# Patient Record
Sex: FEMALE | Race: WHITE | Employment: UNEMPLOYED | URBAN - METROPOLITAN AREA
[De-identification: names, ages, dates, MRNs, and addresses within clinical notes are randomized per-mention and may not be internally consistent; named-entity substitution may affect disease eponyms.]

---

## 2017-12-09 NOTE — H&P
Patient Name: Oscar Corona   Account #: [id number]    Gender: Female    (age): 1935 (80)       Provider:     Pao Kunz MD        Referring Physician:     Brenda Park [address]  [phone] (phone)  [phone] (fax)        Chief Complaint: melena           History of Present Illness:       Long standing problem bowel movement irregularity. Personal history intestinal ischemia. Had no bleeding since  when placed on asa plus clopidogrel. Last week had melena; bowel movements now cleared and appear normal.         Saw Dr Chasidy Little; CBC normal.  Advised she stop clopidogrel       Past Medical History      Medical Conditions: Arthritis  Colitis  High blood pressure  High cholesterol   Surgical Procedures: wrist   Dx Studies: CAT Scan, 2014  Colonoscopy, 2014  Endo Posting, 2017   Medications: amlodipine 5 mg  Aspirin Low Dose 81 mg  benazepril 20 mg  hydrochlorothiazide 25 mg  pravastatin 40 mg   Allergies: tetracycline   Immunizations: No Immunizations      Social History      Alcohol: None   Tobacco: Former smokerCigarettes, quit . Drugs: None   Exercise: None   Caffeine: None         Family History No Knowledge Of Family History       Review of Systems:   Cardiovascular: Denies chest pain, irregular heart beat, palpitations, peripheral edema, syncope, Sweats. Constitutional: Denies fatigue, fever, loss of appetite, weight gain, weight loss. ENMT: Denies nose bleeds, sore throat, hearing loss. Endocrine: Denies excessive thirst, heat intolerance. Eyes: Denies loss of vision. Gastrointestinal: Presents suffers from rectal bleeding. Denies abdominal pain, abdominal swelling, change in bowel habits, constipation, diarrhea, Bloating/gas, heartburn, jaundice, nausea, stomach cramps, vomiting, dysphagia, rectal pain, Stool incontinence.    Genitourinary: Denies dark urine, dysuria, frequent urination, hematuria, incontinence. Hematologic/Lymphatic: Denies easy bruising, prolonged bleeding. Integumentary: Denies itching, rashes, sun sensitivity. Musculoskeletal: Presents suffers from arthritis. Denies back pain, gout, joint pain, muscle weakness, stiffness. Neurological: Denies dizziness, fainting, frequent headaches, memory loss. Psychiatric: Denies anxiety, depression, difficulty sleeping, hallucinations, nervousness, panic attacks, paranoia. Respiratory: Presents suffers from dyspnea. Denies cough, wheezing. Vital Signs: See RN notes     Physical Exam:   Constitutional:      Appearance: No distress, appears comfortable. Skin:      Inspection: No rash, no jaundice. Head/face: Inspection: Normacephalic, atraumatic. Eyes:      Conjunctivae/lids: Normal.   ENMT:      External: Normal.   Neck:      Neck: Normal appearance, trachea midline. Respiratory:      Effort: Normal respiratory effort, comfortable, speaks in complete sentences. Auscultation: normal breath sounds, no rubs, wheezes or rhonchi. Cardiovascular: Auscultation: normal, S1 and S2, no gallops , no rubs or murmurs . Gastrointestinal/Abdomen:      Abdomen: non-distended, nontender. Liver/Spleen: normal, normal size, Liver size and consistency normal, spleen is non-palpable. Musculoskeletal:      Gait/station: normal.   Muscles: normal strength and tone, no atrophy or abnormal movements. Psychiatric:      Judgment/insight: Normal, normal judgement, normal insight. Mood and affect: Normal mood, affect full, no evidence of depression, anxiety or agitation. Lymphatic:      Neck: No lymphadenopathy in the cervical or supraclavicular chain.           Impressions: Melena              Plan: misoprostol 200 mcg Take 1 tablet by mouth three times a day for 30 days  I've discussed EGD, colonoscopy possible biopsy, polypectomy, cautery, injection, alternatives, complications including but not limited to pain, cardiopulmonary event, bleeding, perforation requiring additional blood transfusion or operative repair; all questions answered.

## 2017-12-13 ENCOUNTER — ANESTHESIA EVENT (OUTPATIENT)
Dept: ENDOSCOPY | Age: 82
End: 2017-12-13
Payer: MEDICARE

## 2017-12-13 ENCOUNTER — ANESTHESIA (OUTPATIENT)
Dept: ENDOSCOPY | Age: 82
End: 2017-12-13
Payer: MEDICARE

## 2017-12-13 ENCOUNTER — HOSPITAL ENCOUNTER (OUTPATIENT)
Age: 82
Setting detail: OUTPATIENT SURGERY
Discharge: HOME | End: 2017-12-13
Attending: INTERNAL MEDICINE | Admitting: INTERNAL MEDICINE
Payer: MEDICARE

## 2017-12-13 VITALS
RESPIRATION RATE: 21 BRPM | OXYGEN SATURATION: 98 % | BODY MASS INDEX: 24.91 KG/M2 | SYSTOLIC BLOOD PRESSURE: 117 MMHG | TEMPERATURE: 97.7 F | HEART RATE: 67 BPM | HEIGHT: 66 IN | WEIGHT: 155 LBS | DIASTOLIC BLOOD PRESSURE: 47 MMHG

## 2017-12-13 PROCEDURE — 76060000032 HC ANESTHESIA 0.5 TO 1 HR: Performed by: INTERNAL MEDICINE

## 2017-12-13 PROCEDURE — C1726 CATH, BAL DIL, NON-VASCULAR: HCPCS | Performed by: INTERNAL MEDICINE

## 2017-12-13 PROCEDURE — 74011250636 HC RX REV CODE- 250/636

## 2017-12-13 PROCEDURE — 76040000007: Performed by: INTERNAL MEDICINE

## 2017-12-13 PROCEDURE — 88305 TISSUE EXAM BY PATHOLOGIST: CPT | Performed by: INTERNAL MEDICINE

## 2017-12-13 PROCEDURE — 77030018712 HC DEV BLLN INFL BSC -B: Performed by: INTERNAL MEDICINE

## 2017-12-13 PROCEDURE — 74011000250 HC RX REV CODE- 250

## 2017-12-13 PROCEDURE — 77030013992 HC SNR POLYP ENDOSC BSC -B: Performed by: INTERNAL MEDICINE

## 2017-12-13 RX ORDER — MIDAZOLAM HYDROCHLORIDE 1 MG/ML
.25-5 INJECTION, SOLUTION INTRAMUSCULAR; INTRAVENOUS
Status: DISCONTINUED | OUTPATIENT
Start: 2017-12-13 | End: 2017-12-13 | Stop reason: HOSPADM

## 2017-12-13 RX ORDER — FENTANYL CITRATE 50 UG/ML
100 INJECTION, SOLUTION INTRAMUSCULAR; INTRAVENOUS
Status: DISCONTINUED | OUTPATIENT
Start: 2017-12-13 | End: 2017-12-13 | Stop reason: HOSPADM

## 2017-12-13 RX ORDER — DEXTROMETHORPHAN/PSEUDOEPHED 2.5-7.5/.8
1.2 DROPS ORAL
Status: DISCONTINUED | OUTPATIENT
Start: 2017-12-13 | End: 2017-12-13 | Stop reason: HOSPADM

## 2017-12-13 RX ORDER — GLYCOPYRROLATE 0.2 MG/ML
INJECTION INTRAMUSCULAR; INTRAVENOUS AS NEEDED
Status: DISCONTINUED | OUTPATIENT
Start: 2017-12-13 | End: 2017-12-13 | Stop reason: HOSPADM

## 2017-12-13 RX ORDER — AMOXICILLIN 500 MG/1
1000 CAPSULE ORAL 2 TIMES DAILY
Qty: 56 CAP | Refills: 0 | Status: SHIPPED | OUTPATIENT
Start: 2017-12-13 | End: 2017-12-27

## 2017-12-13 RX ORDER — PROPOFOL 10 MG/ML
INJECTION, EMULSION INTRAVENOUS AS NEEDED
Status: DISCONTINUED | OUTPATIENT
Start: 2017-12-13 | End: 2017-12-13 | Stop reason: HOSPADM

## 2017-12-13 RX ORDER — METRONIDAZOLE 500 MG/1
500 TABLET ORAL 2 TIMES DAILY
Qty: 28 TAB | Refills: 0 | Status: SHIPPED | OUTPATIENT
Start: 2017-12-13 | End: 2017-12-27

## 2017-12-13 RX ORDER — FLUMAZENIL 0.1 MG/ML
0.2 INJECTION INTRAVENOUS
Status: DISCONTINUED | OUTPATIENT
Start: 2017-12-13 | End: 2017-12-13 | Stop reason: HOSPADM

## 2017-12-13 RX ORDER — SODIUM CHLORIDE 9 MG/ML
50 INJECTION, SOLUTION INTRAVENOUS CONTINUOUS
Status: DISCONTINUED | OUTPATIENT
Start: 2017-12-13 | End: 2017-12-13 | Stop reason: HOSPADM

## 2017-12-13 RX ORDER — LIDOCAINE HYDROCHLORIDE 20 MG/ML
INJECTION, SOLUTION EPIDURAL; INFILTRATION; INTRACAUDAL; PERINEURAL AS NEEDED
Status: DISCONTINUED | OUTPATIENT
Start: 2017-12-13 | End: 2017-12-13 | Stop reason: HOSPADM

## 2017-12-13 RX ORDER — NALOXONE HYDROCHLORIDE 0.4 MG/ML
0.4 INJECTION, SOLUTION INTRAMUSCULAR; INTRAVENOUS; SUBCUTANEOUS
Status: DISCONTINUED | OUTPATIENT
Start: 2017-12-13 | End: 2017-12-13 | Stop reason: HOSPADM

## 2017-12-13 RX ORDER — PROPOFOL 10 MG/ML
INJECTION, EMULSION INTRAVENOUS
Status: DISCONTINUED | OUTPATIENT
Start: 2017-12-13 | End: 2017-12-13 | Stop reason: HOSPADM

## 2017-12-13 RX ORDER — SODIUM CHLORIDE 9 MG/ML
INJECTION, SOLUTION INTRAVENOUS
Status: DISCONTINUED | OUTPATIENT
Start: 2017-12-13 | End: 2017-12-13 | Stop reason: HOSPADM

## 2017-12-13 RX ORDER — PANTOPRAZOLE SODIUM 40 MG/1
40 TABLET, DELAYED RELEASE ORAL DAILY
Qty: 90 TAB | Refills: 3 | Status: SHIPPED | OUTPATIENT
Start: 2017-12-13 | End: 2018-03-13

## 2017-12-13 RX ORDER — EPINEPHRINE 0.1 MG/ML
1 INJECTION INTRACARDIAC; INTRAVENOUS
Status: DISCONTINUED | OUTPATIENT
Start: 2017-12-13 | End: 2017-12-13 | Stop reason: HOSPADM

## 2017-12-13 RX ORDER — ATROPINE SULFATE 0.1 MG/ML
0.5 INJECTION INTRAVENOUS
Status: DISCONTINUED | OUTPATIENT
Start: 2017-12-13 | End: 2017-12-13 | Stop reason: HOSPADM

## 2017-12-13 RX ADMIN — LIDOCAINE HYDROCHLORIDE 60 MG: 20 INJECTION, SOLUTION EPIDURAL; INFILTRATION; INTRACAUDAL; PERINEURAL at 12:06

## 2017-12-13 RX ADMIN — SODIUM CHLORIDE: 9 INJECTION, SOLUTION INTRAVENOUS at 11:45

## 2017-12-13 RX ADMIN — GLYCOPYRROLATE 0.1 MG: 0.2 INJECTION INTRAMUSCULAR; INTRAVENOUS at 12:18

## 2017-12-13 RX ADMIN — PROPOFOL 75 MCG/KG/MIN: 10 INJECTION, EMULSION INTRAVENOUS at 12:06

## 2017-12-13 RX ADMIN — PROPOFOL 50 MG: 10 INJECTION, EMULSION INTRAVENOUS at 12:09

## 2017-12-13 RX ADMIN — PROPOFOL 50 MG: 10 INJECTION, EMULSION INTRAVENOUS at 12:05

## 2017-12-13 NOTE — DISCHARGE INSTRUCTIONS
Haile Olivarez  [id number]  1935    DISCHARGE INSTRUCTIONS  Discomfort:  Redness at IV site- apply warm compress to area; if redness or soreness persist- contact your physician. There may be a slight amount of blood passed from the rectum. Gaseous discomfort - walking, belching will help relieve any discomfort. You may not operate a vehicle for 12 hours. You may not engage in an occupation involving machinery or appliances for rest of today. You may not drink alcoholic beverages for at least 12 hours. Avoid making any critical decisions for at least 24 hours. DIET:   High fiber diet. - however -  remember your colon is empty and a heavy meal will produce gas. Avoid these foods:  vegetables, fried / greasy foods, carbonated drinks for today. ACTIVITY:  You may resume your normal daily activities it is recommended that you spend the remainder of the day resting -  avoid any strenuous activity. CALL M.D. ANY SIGN OF:   Increasing pain, nausea, vomiting  Abdominal distension (swelling)  New increased bleeding (oral or rectal)  Fever (chills)  Pain in chest area  Bloody discharge from nose or mouth  Shortness of breath     Follow-up Instructions:  Call Dr. Med Roldan if you have any questions or problems.   Stay on daily Miralax and pantoprazole permanently        DISCHARGE SUMMARY from Nurse    The following personal items collected during your admission are returned to you:   Dental Appliance: Dental Appliances: Lowers, Uppers  Vision: Visual Aid: Glasses  Hearing Aid:    Jewelry:    Clothing:    Other Valuables:    Valuables sent to safe:

## 2017-12-13 NOTE — IP AVS SNAPSHOT
[address] 
[phone] Patient: Tony Henry MRN: [id number] :1935 About your hospitalization You were admitted on:  2017 You last received care in the:  [facility] ENDOSCOPY You were discharged on:  2017 Why you were hospitalized Your primary diagnosis was:  Not on File Things You Need To Do (next 8 weeks) Follow up with Nathan Mendoza MD  
  
Phone:  [phone] Where:  [address], [address] Discharge Orders None A check janet indicates which time of day the medication should be taken. My Medications TAKE these medications as instructed Instructions Each Dose to Equal  
 Morning Noon Evening Bedtime  
 amoxicillin 500 mg capsule Commonly known as:  AMOXIL Your last dose was: Your next dose is: Take 2 Caps by mouth two (2) times a day for 14 days. Indications: diverticulitis 1000 mg  
    
   
   
   
  
 aspirin 81 mg chewable tablet Your last dose was: Your next dose is: Take 1 tablet by mouth daily. 81 mg  
    
   
   
   
  
 benazepril 20 mg tablet Commonly known as:  LOTENSIN Your last dose was: Your next dose is: Take 20 mg by mouth daily. 20 mg  
    
   
   
   
  
 FISH -1,000 mg capsule Generic drug:  fish oil-omega-3 fatty acids Your last dose was: Your next dose is: Take 3 capsules by mouth daily. 3 Cap  
    
   
   
   
  
 hydroCHLOROthiazide 25 mg tablet Commonly known as:  HYDRODIURIL Your last dose was: Your next dose is: Take 25 mg by mouth daily. 25 mg  
    
   
   
   
  
 metroNIDAZOLE 500 mg tablet Commonly known as:  FLAGYL Your last dose was: Your next dose is: Take 1 Tab by mouth two (2) times a day for 14 days. Indications: Diverticulitis of Gastrointestinal Tract 500 mg  
    
   
   
   
  
 NORVASC 5 mg tablet Generic drug:  amLODIPine Your last dose was: Your next dose is: Take 5 mg by mouth daily. 5 mg  
    
   
   
   
  
 pantoprazole 40 mg tablet Commonly known as:  PROTONIX Your last dose was: Your next dose is: Take 1 Tab by mouth daily for 90 days. Indications: Erosive Esophagitis 40 mg  
    
   
   
   
  
 pravastatin 40 mg tablet Commonly known as:  PRAVACHOL Your last dose was: Your next dose is: Take 40 mg by mouth nightly. 40 mg Where to Get Your Medications These medications were sent to [address] [facility], [facility] [address]  [address], [address] [facility] [address] Phone:  [phone]  
  amoxicillin 500 mg capsule  
 metroNIDAZOLE 500 mg tablet  
 pantoprazole 40 mg tablet Discharge Instructions Maira Martinez [id number] 
1935 DISCHARGE INSTRUCTIONS Discomfort: 
Redness at IV site- apply warm compress to area; if redness or soreness persist- contact your physician. There may be a slight amount of blood passed from the rectum. Gaseous discomfort - walking, belching will help relieve any discomfort. You may not operate a vehicle for 12 hours. You may not engage in an occupation involving machinery or appliances for rest of today. You may not drink alcoholic beverages for at least 12 hours. Avoid making any critical decisions for at least 24 hours. DIET: 
 High fiber diet.  however -  remember your colon is empty and a heavy meal will produce gas. Avoid these foods:  vegetables, fried / greasy foods, carbonated drinks for today.  
 
  
ACTIVITY: 
You may resume your normal daily activities it is recommended that you spend the remainder of the day resting -  avoid any strenuous activity. CALL M.D. ANY SIGN OF: Increasing pain, nausea, vomiting Abdominal distension (swelling) New increased bleeding (oral or rectal) Fever (chills) Pain in chest area Bloody discharge from nose or mouth Shortness of breath Follow-up Instructions: 
Call Dr. Eliazar Vaughn if you have any questions or problems. Stay on daily Miralax and pantoprazole permanently DISCHARGE SUMMARY from Nurse The following personal items collected during your admission are returned to you:  
Dental Appliance: Dental Appliances: Lowers, Uppers Vision: Visual Aid: Glasses Hearing Aid:   
Jewelry:   
Clothing:   
Other Valuables:   
Valuables sent to safe:   
 
 
  
  
  
Introducing [facility]! Sonja Dawn introduces [facility] patient portal. Now you can access parts of your medical record, email your doctor's office, and request medication refills online. 1. In your internet browser, go to [email or web address] 2. Click on the First Time User? Click Here link in the Sign In box. You will see the New Member Sign Up page. 3. Enter your [facility] Access Code exactly as it appears below. You will not need to use this code after youve completed the sign-up process. If you do not sign up before the expiration date, you must request a new code. · [facility] Access Code: [email or web address] Expires: 3/13/2018 10:11 AM 
 
4. Enter the last four digits of your Social Security Number (xxxx) and Date of Birth (mm/dd/yyyy) as indicated and click Submit. You will be taken to the next sign-up page. 5. Create a [facility] ID. This will be your [facility] login ID and cannot be changed, so think of one that is secure and easy to remember. 6. Create a [facility] password. You can change your password at any time. 7. Enter your Password Reset Question and Answer. This can be used at a later time if you forget your password. 8. Enter your e-mail address. You will receive e-mail notification when new information is available in [address] 9. Click Sign Up. You can now view and download portions of your medical record. 10. Click the Download Summary menu link to download a portable copy of your medical information. If you have questions, please visit the Frequently Asked Questions section of the [employer] website. Remember, [employer] is NOT to be used for urgent needs. For medical emergencies, dial 911. Now available from your iPhone and Android! Providers Seen During Your Hospitalization Provider Specialty Primary office phone Michelet Garner MD Gastroenterology [phone] Your Primary Care Physician (PCP) Primary Care Physician Office Phone Office Fax Cassie Kaybethany [phone] You are allergic to the following Allergen Reactions Tetracycline Other (comments) Mouth soreness Recent Documentation Height Weight BMI OB Status Smoking Status 1.676 m 70.3 kg 25.02 kg/m2 Postmenopausal Former Smoker Emergency Contacts Name Discharge Info Relation Home Work Mobile [place] DISCHARGE CAREGIVER [3] Child [2] [phone] Patient Belongings The following personal items are in your possession at time of discharge: 
  Dental Appliances: Lowers, Uppers  Visual Aid: Glasses Please provide this summary of care documentation to your next provider. Signatures-by signing, you are acknowledging that this After Visit Summary has been reviewed with you and you have received a copy. Patient Signature:  ____________________________________________________________ Date:  ____________________________________________________________  
  
Lb Yancey Provider Signature:  ____________________________________________________________ Date:  ____________________________________________________________

## 2017-12-13 NOTE — PROCEDURES
Joseph Rojas M.D. 2017    Esophagogastroduodenoscopy (EGD) Procedure Note  Rosi Iglesias  : 1935  Lisa Matta Medical Record Number: [id number]      Indications:    Melena/hematochezia  Referring Physician:  Anisha Lay MD  Anesthesia/Sedation: Conscious Sedation/Moderate Sedation  Endoscopist:  Dr. Danish Stoo:  The indications, risks, benefits and alternatives were reviewed with the patient or their decision maker who was provided an opportunity to ask questions and all questions were answered. The specific risks of esophagogastroduodenoscopy with conscious sedation were reviewed, including but not limited to anesthetic complication, bleeding, adverse drug reaction, missed lesion, infection, IV site reactions, and intestinal perforation which would lead to the need for surgical repair. Alternatives to EGD including radiographic imaging, observation without testing, or laboratory testing were reviewed as well as the limitations of those alternatives discussed. After considering the options and having all their questions answered, the patient or their decision maker provided both verbal and written consent to proceed. Procedure in Detail:  After obtaining informed consent, positioning of the patient in the left lateral decubitus position, and conduction of a pre-procedure pause or \"time out\" the endoscope was introduced into the mouth and advanced to the third portion duodenum. A careful inspection was made, and findings or interventions are described below.     Findings:   Esophagus:erosion semi circumferential at EG junction with stricture and lumen diameter 13 mm; no ulcer, mass effect  Stomach: hiatal hernia and no mucosal lesion appreciated  Duodenum/jejunum: minute bulb erosions without ulcer, mass effect, avm    Complications/estimated blood loss: none    Therapies: esophageal dilation with 20 mm sized balloon    Specimens: none           Recommendations:  -Acid suppression with a proton pump inhibitor; permanent recommendation    Thank you for entrusting me with this patient's care. Please do not hesitate to contact me with any questions or if I can be of assistance with any of your other patients' GI needs.   Signed By: John Shin MD                        December 13, 2017

## 2017-12-13 NOTE — PROGRESS NOTES
See anesthesia flow-sheet for procedural sedation and vital signs. No respiratory distress. Abdomen without distention. Stable for transfer to recovery per anesthesia. Polyp specimen sent to lab. CRE balloon dilatation of the esophagus   20 mm Balloon inflated to 6 ATMs and held for 60 seconds. No subcutaneous crepitus of the chest or cervical region was noted post dilatation.

## 2017-12-13 NOTE — PROCEDURES
[address]  [phone]      2017    Colonoscopy Procedure Note  Frances Anderson  :  1935  [facility] Medical Record Number: [id number]    Indications:     Hematochezia/melena  PCP:  Cas Cervantes MD  Anesthesia/Sedation: Conscious Sedation/Moderate Sedation  Endoscopist:  Dr. Cynthia Stevens  Complications:  None  Estimate Blood Loss:  None    Permit:  The indications, risks, benefits and alternatives were reviewed with the patient or their decision maker who was provided an opportunity to ask questions and all questions were answered. The specific risks of colonoscopy with conscious sedation were reviewed, including but not limited to anesthetic complication, bleeding, adverse drug reaction, missed lesion, infection, IV site reactions, and intestinal perforation which would lead to the need for surgical repair. Alternatives to colonoscopy including radiographic imaging, observation without testing, or laboratory testing were reviewed including the limitations of those alternatives. After considering the options and having all their questions answered, the patient or their decision maker provided both verbal and written consent to proceed. Procedure in Detail:  After obtaining informed consent, positioning of the patient in the left lateral decubitus position, and conduction of a pre-procedure pause or \"time out\" the endoscope was introduced into the anus and advanced to the cecum, which was identified by the ileocecal valve and appendiceal orifice. The quality of the colonic preparation was good. A careful inspection was made as the colonoscope was withdrawn, findings and interventions are described below.     Appendiceal orifice photographed    Findings:       - Diverticulosis  Too numerous to count sigmoid and descending; mild edema seen sigmoid in association diverticulosis  Single sessile 14 mm transverse polyp    Specimens:    polyp removed cold snare    Complications:   None; patient tolerated the procedure well. Estimated blood loss: none    Impression:  mild colonic diverticulitis; colon polyp. Both may be incidental to melena    Recommendations:      - two weeks oral antibiotics. In absence of adverse clinical findings routine follow up colon exam not recommended    Thank you for entrusting me with this patient's care. Please do not hesitate to contact me with any questions or if I can be of assistance with any of your other patients' GI needs.     Signed By: Yocasta Du MD                        December 13, 2017

## 2017-12-13 NOTE — ANESTHESIA POSTPROCEDURE EVALUATION
Post-Anesthesia Evaluation and Assessment    Patient: Kota Taylor MRN: [id number]  SSN: [id number]    YOB: 1935  Age: 80 y.o. Sex: female       Cardiovascular Function/Vital Signs  Visit Vitals    /47    Pulse 67    Temp 36.5 °C (97.7 °F)    Resp 21    Ht 5' 6\" (1.676 m)    Wt 70.3 kg (155 lb)    SpO2 98%    BMI 25.02 kg/m2       Patient is status post MAC anesthesia for Procedure(s):  COLONOSCOPY, EGD  ESOPHAGOGASTRODUODENOSCOPY (EGD)  ESOPHAGEAL DILATION  ENDOSCOPIC POLYPECTOMY. Nausea/Vomiting: None    Postoperative hydration reviewed and adequate. Pain:  Pain Scale 1: Numeric (0 - 10) (12/13/17 1326)  Pain Intensity 1: 0 (12/13/17 1326)   Managed    Neurological Status: At baseline    Mental Status and Level of Consciousness: Arousable    Pulmonary Status:   O2 Device: Room air (12/13/17 1326)   Adequate oxygenation and airway patent    Complications related to anesthesia: None    Post-anesthesia assessment completed.  No concerns    Signed By: Nika Land MD     December 13, 2017

## 2017-12-13 NOTE — ANESTHESIA PREPROCEDURE EVALUATION
Anesthetic History   No history of anesthetic complications            Review of Systems / Medical History  Patient summary reviewed, nursing notes reviewed and pertinent labs reviewed    Pulmonary  Within defined limits                 Neuro/Psych   Within defined limits           Cardiovascular    Hypertension          Hyperlipidemia    Exercise tolerance: >4 METS  Comments: Not on beta blocker   GI/Hepatic/Renal  Within defined limits              Endo/Other  Within defined limits           Other Findings   Comments: colitis         Physical Exam    Airway  Mallampati: III  TM Distance: 4 - 6 cm  Neck ROM: normal range of motion   Mouth opening: Normal     Cardiovascular  Regular rate and rhythm,  S1 and S2 normal,  no murmur, click, rub, or gallop  Rhythm: regular  Rate: normal         Dental    Dentition: Full upper dentures and Full lower dentures     Pulmonary  Breath sounds clear to auscultation               Abdominal  GI exam deferred       Other Findings            Anesthetic Plan    ASA: 2  Anesthesia type: MAC            Anesthetic plan and risks discussed with: Patient

## 2017-12-13 NOTE — ROUTINE PROCESS
Amy Ang  1935  [id number]    Situation:  Verbal report received from: Kassi Maciel, MICHELA  Procedure: Procedure(s):  COLONOSCOPY, EGD  ESOPHAGOGASTRODUODENOSCOPY (EGD)  ESOPHAGEAL DILATION    Background:    Preoperative diagnosis: COLITIS, ISCHEMIC  Postoperative diagnosis: Duodenitis  Reflux  Esophagitis  Esophageal Stricture    :  Dr. Sajan Carbone  Assistant(s): Endoscopy RN-1: Aure Metcalf RN  Endoscopy RN-2: Rosemarie Hernandez RN    Specimens: * No specimens in log *  H. Pylori  no    Assessment:  Intra-procedure medications   Anesthesia gave intra-procedure sedation and medications, see anesthesia flow sheet yes    Intravenous fluids: NS@ KVO     Vital signs stable     Abdominal assessment: round and soft     Recommendation:  Discharge patient per MD order.   Family or Friend   Permission to share finding with family or friend yes

## 2017-12-13 NOTE — PROGRESS NOTES
Assumed care of patient from anesthesia.  Endoscope was pre-cleaned at bedside immediately following procedure by Lance Tafoya

## 2020-01-12 ENCOUNTER — HOSPITAL ENCOUNTER (EMERGENCY)
Age: 85
Discharge: HOME | End: 2020-01-12
Attending: STUDENT IN AN ORGANIZED HEALTH CARE EDUCATION/TRAINING PROGRAM | Admitting: STUDENT IN AN ORGANIZED HEALTH CARE EDUCATION/TRAINING PROGRAM
Payer: MEDICARE

## 2020-01-12 VITALS
SYSTOLIC BLOOD PRESSURE: 123 MMHG | TEMPERATURE: 98.1 F | DIASTOLIC BLOOD PRESSURE: 67 MMHG | OXYGEN SATURATION: 94 % | HEART RATE: 51 BPM | BODY MASS INDEX: 24.48 KG/M2 | WEIGHT: 156 LBS | RESPIRATION RATE: 19 BRPM | HEIGHT: 67 IN

## 2020-01-12 DIAGNOSIS — T88.7XXA MEDICATION SIDE EFFECT: Primary | ICD-10-CM

## 2020-01-12 LAB
ALBUMIN SERPL-MCNC: 3.8 G/DL (ref 3.5–5)
ALBUMIN/GLOB SERPL: 0.9 {RATIO} (ref 1.1–2.2)
ALP SERPL-CCNC: 87 U/L (ref 45–117)
ALT SERPL-CCNC: 25 U/L (ref 12–78)
ANION GAP SERPL CALC-SCNC: 6 MMOL/L (ref 5–15)
AST SERPL-CCNC: 16 U/L (ref 15–37)
BASOPHILS # BLD: 0 K/UL (ref 0–0.1)
BASOPHILS NFR BLD: 0 % (ref 0–1)
BILIRUB SERPL-MCNC: 0.4 MG/DL (ref 0.2–1)
BUN SERPL-MCNC: 22 MG/DL (ref 6–20)
BUN/CREAT SERPL: 21 (ref 12–20)
CALCIUM SERPL-MCNC: 9.3 MG/DL (ref 8.5–10.1)
CHLORIDE SERPL-SCNC: 107 MMOL/L (ref 97–108)
CO2 SERPL-SCNC: 26 MMOL/L (ref 21–32)
COMMENT, HOLDF: NORMAL
CREAT SERPL-MCNC: 1.05 MG/DL (ref 0.55–1.02)
DIFFERENTIAL METHOD BLD: ABNORMAL
EOSINOPHIL # BLD: 0.1 K/UL (ref 0–0.4)
EOSINOPHIL NFR BLD: 1 % (ref 0–7)
ERYTHROCYTE [DISTWIDTH] IN BLOOD BY AUTOMATED COUNT: 13.2 % (ref 11.5–14.5)
GLOBULIN SER CALC-MCNC: 4.1 G/DL (ref 2–4)
GLUCOSE SERPL-MCNC: 132 MG/DL (ref 65–100)
HCT VFR BLD AUTO: 38.7 % (ref 35–47)
HEMOCCULT STL QL: NEGATIVE
HGB BLD-MCNC: 12.7 G/DL (ref 11.5–16)
IMM GRANULOCYTES # BLD AUTO: 0.1 K/UL (ref 0–0.04)
IMM GRANULOCYTES NFR BLD AUTO: 1 % (ref 0–0.5)
LYMPHOCYTES # BLD: 1.4 K/UL (ref 0.8–3.5)
LYMPHOCYTES NFR BLD: 18 % (ref 12–49)
MCH RBC QN AUTO: 32.1 PG (ref 26–34)
MCHC RBC AUTO-ENTMCNC: 32.8 G/DL (ref 30–36.5)
MCV RBC AUTO: 97.7 FL (ref 80–99)
MONOCYTES # BLD: 0.8 K/UL (ref 0–1)
MONOCYTES NFR BLD: 11 % (ref 5–13)
NEUTS SEG # BLD: 5.2 K/UL (ref 1.8–8)
NEUTS SEG NFR BLD: 69 % (ref 32–75)
NRBC # BLD: 0 K/UL (ref 0–0.01)
NRBC BLD-RTO: 0 PER 100 WBC
PLATELET # BLD AUTO: 402 K/UL (ref 150–400)
PMV BLD AUTO: 9.1 FL (ref 8.9–12.9)
POTASSIUM SERPL-SCNC: 3.5 MMOL/L (ref 3.5–5.1)
PROT SERPL-MCNC: 7.9 G/DL (ref 6.4–8.2)
RBC # BLD AUTO: 3.96 M/UL (ref 3.8–5.2)
SAMPLES BEING HELD,HOLD: NORMAL
SODIUM SERPL-SCNC: 139 MMOL/L (ref 136–145)
WBC # BLD AUTO: 7.5 K/UL (ref 3.6–11)

## 2020-01-12 PROCEDURE — 86900 BLOOD TYPING SEROLOGIC ABO: CPT

## 2020-01-12 PROCEDURE — 99284 EMERGENCY DEPT VISIT MOD MDM: CPT

## 2020-01-12 PROCEDURE — 80053 COMPREHEN METABOLIC PANEL: CPT

## 2020-01-12 PROCEDURE — 82272 OCCULT BLD FECES 1-3 TESTS: CPT

## 2020-01-12 PROCEDURE — 85025 COMPLETE CBC W/AUTO DIFF WBC: CPT

## 2020-01-12 PROCEDURE — 36415 COLL VENOUS BLD VENIPUNCTURE: CPT

## 2020-01-13 LAB
ABO + RH BLD: NORMAL
BLOOD GROUP ANTIBODIES SERPL: NORMAL
SPECIMEN EXP DATE BLD: NORMAL

## 2020-01-13 NOTE — ED TRIAGE NOTES
Patient arrives with complaints of dark stools since last night     Denies abd pain/n/v/d    Patient had been taking iron 3 nights prior to noticing dark stools

## 2020-01-13 NOTE — ED PROVIDER NOTES
82 y.o. female with past medical history significant for HTN and hyperlipidemia who presents from home via personal vehicle with chief complaint of stool color change. Pt presents in the ED for evaluation of \"black stools. \" Pt states onset to be last night. Pt notes she restarted taking iron supplement 3 days ago and was not sure if this was the cause or not. Pt notes a history of diverticulitis 3-4 years ago during which she had dark stools. Pt denies taking pepto bismol recently. Pt denies taking blood thinners. There are no other acute medical concerns at this time. Social hx: Former smoker. PCP: Viri Escobar MD      Note written by aleksandar Snow, as dictated by Aster Jimenez MD 9:00 PM      The history is provided by the patient and a relative. No  was used.         Past Medical History:   Diagnosis Date    Hyperlipidemia     Hypertension        Past Surgical History:   Procedure Laterality Date    COLONOSCOPY N/A 2017    COLONOSCOPY, [facility]         Family History:   Problem Relation Age of Onset    Cancer Brother        Social History     Socioeconomic History    Marital status:      Spouse name: Not on file    Number of children: Not on file    Years of education: Not on file    Highest education level: Not on file   Occupational History    Not on file   Social Needs    Financial resource strain: Not on file    Food insecurity:     Worry: Not on file     Inability: Not on file    Transportation needs:     Medical: Not on file     Non-medical: Not on file   Tobacco Use    Smoking status: Former Smoker     Last attempt to quit: 2015     Years since quittin.0    Smokeless tobacco: Never Used   Substance and Sexual Activity    Alcohol use: No    Drug use: No    Sexual activity: Not on file   Lifestyle    Physical activity:     Days per week: Not on file     Minutes per session: Not on file    Stress: Not on file   Relationships    Social connections:     Talks on phone: Not on file     Gets together: Not on file     Attends Restoration service: Not on file     Active member of club or organization: Not on file     Attends meetings of clubs or organizations: Not on file     Relationship status: Not on file    Intimate partner violence:     Fear of current or ex partner: Not on file     Emotionally abused: Not on file     Physically abused: Not on file     Forced sexual activity: Not on file   Other Topics Concern    Not on file   Social History Narrative    Not on file         ALLERGIES: Tetracycline    Review of Systems   Constitutional: Negative for activity change, diaphoresis, fatigue and fever. HENT: Negative for congestion and sore throat. Eyes: Negative for photophobia and visual disturbance. Respiratory: Negative for chest tightness and shortness of breath. Cardiovascular: Negative for chest pain, palpitations and leg swelling. Gastrointestinal: Positive for blood in stool. Negative for abdominal pain, constipation, diarrhea, nausea and vomiting. Positive for black stools. Genitourinary: Negative for difficulty urinating, dysuria, flank pain, frequency and hematuria. Musculoskeletal: Negative for back pain. Neurological: Negative for dizziness, syncope, numbness and headaches. All other systems reviewed and are negative. Vitals:    01/12/20 2056 01/12/20 2058   BP:  159/68   Pulse:  (!) 51   Resp:  19   Temp:  98.1 °F (36.7 °C)   SpO2:  95%   Weight: 70.8 kg (156 lb)    Height: 5' 7\" (1.702 m)             Physical Exam  Vitals signs and nursing note reviewed. Constitutional:       General: She is not in acute distress. Appearance: She is well-developed. She is not diaphoretic. HENT:      Head: Normocephalic and atraumatic. Nose: Nose normal.      Mouth/Throat:      Pharynx: No oropharyngeal exudate.    Eyes:      General: No scleral icterus. Right eye: No discharge. Left eye: No discharge. Conjunctiva/sclera: Conjunctivae normal.   Neck:      Musculoskeletal: Normal range of motion and neck supple. Thyroid: No thyromegaly. Vascular: No JVD. Trachea: No tracheal deviation. Cardiovascular:      Rate and Rhythm: Normal rate and regular rhythm. Heart sounds: Normal heart sounds. No murmur. No friction rub. No gallop. Pulmonary:      Effort: Pulmonary effort is normal. No respiratory distress. Breath sounds: Normal breath sounds. No stridor. No wheezing or rales. Chest:      Chest wall: No tenderness. Abdominal:      General: Bowel sounds are normal. There is no distension. Palpations: There is no mass. Tenderness: There is no tenderness. There is no rebound. Musculoskeletal: Normal range of motion. General: No tenderness. Lymphadenopathy:      Cervical: No cervical adenopathy. Skin:     General: Skin is warm and dry. Coloration: Skin is not pale. Findings: No erythema or rash. Neurological:      Mental Status: She is alert and oriented to person, place, and time. Cranial Nerves: No cranial nerve deficit. Coordination: Coordination normal.   Psychiatric:         Behavior: Behavior normal.         Thought Content: Thought content normal.         Judgment: Judgment normal.      Note written by aleksandar Gallegos, as dictated by Roderick Garcia MD 9:00 PM      MDM  Number of Diagnoses or Management Options  Medication side effect:   Diagnosis management comments: A/P: Medication side effect. 14-year-old female presenting with dark stools recently after taken iron supplementation patient has no physical complaints of abdominal pain. Likely diagnosis of medication side effect of iron supplementation. CBC, CMP, type and screen, fecal occult blood.        Amount and/or Complexity of Data Reviewed  Clinical lab tests: ordered and reviewed  Independent visualization of images, tracings, or specimens: yes    Risk of Complications, Morbidity, and/or Mortality  Presenting problems: low  Diagnostic procedures: low  Management options: low    Patient Progress  Patient progress: stable         Procedures

## 2020-01-13 NOTE — DISCHARGE INSTRUCTIONS
Patient Education        Side Effects of Medicine: Care Instructions  Your Care Instructions    Medicines are a big part of treatment for many health problems. But all medicines have side effects. Often these are mild problems. They might include a dry mouth or upset stomach. But sometimes medicines can cause dangerous side effects. One example is a bad allergic reaction. The best treatment will depend on what side effects you have. If you have a serious side effect, you may need to stop taking the medicine. You may also need to take another medicine to treat the side effect. If you have a mild side effect, it may go away after you take the medicine for a while. The doctor has checked you carefully, but problems can develop later. If you notice any problems or new symptoms, get medical treatment right away. Follow-up care is a key part of your treatment and safety. Be sure to make and go to all appointments, and call your doctor if you are having problems. It's also a good idea to know your test results and keep a list of the medicines you take. How can you care for yourself at home? · Be safe with medicines. Take your medicines exactly as prescribed. Call your doctor if you think you are having a problem with your medicine. · Call your doctor if side effects bother you and you wonder if you should keep taking a medicine. Your doctor may be able to lower your dose or change your medicine. Do not suddenly quit taking your medicine unless a doctor tells you to. · Make sure your doctor has a list of all the medicines, vitamins, supplements, and herbal remedies you take. Ask about side effects. When should you call for help? Watch closely for changes in your health, and be sure to contact your doctor if:    · You think you are having a new problem with your medicine.     · You do not get better as expected. Where can you learn more? Go to http://zohreh-jazzmine.info/.   Enter D152 in the search box to learn more about \"Side Effects of Medicine: Care Instructions. \"  Current as of: December 13, 2018  Content Version: 12.2  © 2618-0123 [employer]. Care instructions adapted under license by [employer] (which disclaims liability or warranty for this information). If you have questions about a medical condition or this instruction, always ask your healthcare professional. [place] [address] any warranty or liability for your use of this information.

## 2023-02-20 ENCOUNTER — TRANSCRIBE ORDER (OUTPATIENT)
Dept: SCHEDULING | Age: 88
End: 2023-02-20

## 2023-02-20 DIAGNOSIS — I71.40 AAA (ABDOMINAL AORTIC ANEURYSM): Primary | ICD-10-CM

## 2023-02-22 ENCOUNTER — HOSPITAL ENCOUNTER (OUTPATIENT)
Dept: CT IMAGING | Age: 88
Discharge: HOME | End: 2023-02-22
Payer: MEDICARE

## 2023-02-22 ENCOUNTER — HOSPITAL ENCOUNTER (OUTPATIENT)
Dept: GENERAL RADIOLOGY | Age: 88
Discharge: HOME | End: 2023-02-22
Payer: MEDICARE

## 2023-02-22 ENCOUNTER — HOSPITAL ENCOUNTER (OUTPATIENT)
Dept: PREADMISSION TESTING | Age: 88
Discharge: HOME | End: 2023-02-22
Payer: MEDICARE

## 2023-02-22 VITALS
WEIGHT: 153 LBS | BODY MASS INDEX: 28.16 KG/M2 | HEIGHT: 62 IN | RESPIRATION RATE: 18 BRPM | TEMPERATURE: 96.9 F | OXYGEN SATURATION: 96 % | HEART RATE: 88 BPM | SYSTOLIC BLOOD PRESSURE: 167 MMHG | DIASTOLIC BLOOD PRESSURE: 85 MMHG

## 2023-02-22 DIAGNOSIS — I71.40 AAA (ABDOMINAL AORTIC ANEURYSM): ICD-10-CM

## 2023-02-22 LAB
ABO + RH BLD: NORMAL
ALBUMIN SERPL-MCNC: 4 G/DL (ref 3.5–5)
ALBUMIN/GLOB SERPL: 1.2 (ref 1.1–2.2)
ALP SERPL-CCNC: 79 U/L (ref 45–117)
ALT SERPL-CCNC: 21 U/L (ref 12–78)
ANION GAP SERPL CALC-SCNC: 6 MMOL/L (ref 5–15)
APTT PPP: 23.6 SEC (ref 22.1–31)
AST SERPL-CCNC: 14 U/L (ref 15–37)
BASOPHILS # BLD: 0 K/UL (ref 0–0.1)
BASOPHILS NFR BLD: 0 % (ref 0–1)
BILIRUB SERPL-MCNC: 0.9 MG/DL (ref 0.2–1)
BLOOD GROUP ANTIBODIES SERPL: NORMAL
BUN SERPL-MCNC: 35 MG/DL (ref 6–20)
BUN/CREAT SERPL: 31 (ref 12–20)
CALCIUM SERPL-MCNC: 9.8 MG/DL (ref 8.5–10.1)
CHLORIDE SERPL-SCNC: 105 MMOL/L (ref 97–108)
CO2 SERPL-SCNC: 29 MMOL/L (ref 21–32)
CREAT SERPL-MCNC: 1.13 MG/DL (ref 0.55–1.02)
DIFFERENTIAL METHOD BLD: ABNORMAL
EOSINOPHIL # BLD: 0.1 K/UL (ref 0–0.4)
EOSINOPHIL NFR BLD: 2 % (ref 0–7)
ERYTHROCYTE [DISTWIDTH] IN BLOOD BY AUTOMATED COUNT: 12.8 % (ref 11.5–14.5)
GLOBULIN SER CALC-MCNC: 3.4 G/DL (ref 2–4)
GLUCOSE SERPL-MCNC: 101 MG/DL (ref 65–100)
HCT VFR BLD AUTO: 41.8 % (ref 35–47)
HGB BLD-MCNC: 13.3 G/DL (ref 11.5–16)
IMM GRANULOCYTES # BLD AUTO: 0 K/UL (ref 0–0.04)
IMM GRANULOCYTES NFR BLD AUTO: 0 % (ref 0–0.5)
INR PPP: 1 (ref 0.9–1.1)
LYMPHOCYTES # BLD: 1.3 K/UL (ref 0.8–3.5)
LYMPHOCYTES NFR BLD: 20 % (ref 12–49)
MCH RBC QN AUTO: 32.6 PG (ref 26–34)
MCHC RBC AUTO-ENTMCNC: 31.8 G/DL (ref 30–36.5)
MCV RBC AUTO: 102.5 FL (ref 80–99)
MONOCYTES # BLD: 0.6 K/UL (ref 0–1)
MONOCYTES NFR BLD: 10 % (ref 5–13)
NEUTS SEG # BLD: 4.3 K/UL (ref 1.8–8)
NEUTS SEG NFR BLD: 68 % (ref 32–75)
NRBC # BLD: 0 K/UL (ref 0–0.01)
NRBC BLD-RTO: 0 PER 100 WBC
PLATELET # BLD AUTO: 280 K/UL (ref 150–400)
PMV BLD AUTO: 10.1 FL (ref 8.9–12.9)
POTASSIUM SERPL-SCNC: 4.3 MMOL/L (ref 3.5–5.1)
PROT SERPL-MCNC: 7.4 G/DL (ref 6.4–8.2)
PROTHROMBIN TIME: 10.4 SEC (ref 9–11.1)
RBC # BLD AUTO: 4.08 M/UL (ref 3.8–5.2)
SODIUM SERPL-SCNC: 140 MMOL/L (ref 136–145)
SPECIMEN EXP DATE BLD: NORMAL
THERAPEUTIC RANGE,PTTT: NORMAL SECS (ref 58–77)
WBC # BLD AUTO: 6.4 K/UL (ref 3.6–11)

## 2023-02-22 PROCEDURE — 85730 THROMBOPLASTIN TIME PARTIAL: CPT

## 2023-02-22 PROCEDURE — 71046 X-RAY EXAM CHEST 2 VIEWS: CPT

## 2023-02-22 PROCEDURE — 74174 CTA ABD&PLVS W/CONTRAST: CPT

## 2023-02-22 PROCEDURE — 82565 ASSAY OF CREATININE: CPT

## 2023-02-22 PROCEDURE — 74011000636 HC RX REV CODE- 636

## 2023-02-22 PROCEDURE — 93005 ELECTROCARDIOGRAM TRACING: CPT

## 2023-02-22 PROCEDURE — 86900 BLOOD TYPING SEROLOGIC ABO: CPT

## 2023-02-22 PROCEDURE — 85610 PROTHROMBIN TIME: CPT

## 2023-02-22 PROCEDURE — 36415 COLL VENOUS BLD VENIPUNCTURE: CPT

## 2023-02-22 PROCEDURE — 80053 COMPREHEN METABOLIC PANEL: CPT

## 2023-02-22 PROCEDURE — 85025 COMPLETE CBC W/AUTO DIFF WBC: CPT

## 2023-02-22 RX ORDER — CALCIUM CARB/VITAMIN D3/VIT K1 500-500-40
1 TABLET,CHEWABLE ORAL EVERY MORNING
COMMUNITY

## 2023-02-22 RX ORDER — LORATADINE 10 MG/1
10 TABLET ORAL EVERY MORNING
COMMUNITY

## 2023-02-22 RX ORDER — TIOTROPIUM BROMIDE AND OLODATEROL 3.124; 2.736 UG/1; UG/1
2 SPRAY, METERED RESPIRATORY (INHALATION) EVERY MORNING
COMMUNITY

## 2023-02-22 RX ADMIN — IOPAMIDOL 100 ML: 755 INJECTION, SOLUTION INTRAVENOUS at 16:52

## 2023-02-22 NOTE — PERIOP NOTES
[address], [address] [facility], [place]   MAIN OR                                  [phone]   MAIN PRE OP                          [phone]                                                                                AMBULATORY PRE OP          [phone]  PRE-ADMISSION TESTING    [phone]   Surgery Date:  3/2/2023       Is surgery arrival time given by surgeon? NO  If NO, [address] [facility] staff will call you between 3 and 7pm the day before your surgery with your arrival time. (If your surgery is on a Monday, we will call you the Friday before.)    Call [phone] after 7pm Monday-Friday if you did not receive this call. INSTRUCTIONS BEFORE YOUR SURGERY   When You  Arrive Arrive at the [address]nd [address] [facility] on the day of your surgery  Have your insurance card, photo ID, and any copayment (if needed)   Food   and   Drink NO food or drink after midnight the night before surgery    This means NO water, gum, mints, coffee, juice, etc.  No alcohol (beer, wine, liquor) or marijuana 24 hours before and after surgery   Medications to   TAKE   Morning of Surgery MEDICATIONS TO TAKE THE MORNING OF SURGERY WITH A SIP OF WATER:   Amlodipine  Claritin  Stiolto    DO NOT TAKE BENAZAPRIL OR HYDROCHLOROTHIAZIDE THE MORNING OF SURGERY. Medications  To  STOP      7 days before surgery Non-Steroidal anti-inflammatory Drugs (NSAID's): for example, Ibuprofen (Advil, Motrin), Naproxen (Aleve)  Aspirin, if taking for pain   Herbal supplements, vitamins, and fish oil    (Pain medications not listed above, including Tylenol may be taken)   Blood  Thinners If you take  Aspirin, Plavix, Coumadin, or any blood-thinning or anti-blood clot medicine, talk to the doctor who prescribed the medications for pre-operative instructions.    Bathing Clothing  Jewelry  Valuables     If you shower the morning of surgery, please do not apply anything to your skin (lotions, powders, deodorant, or makeup, especially mascara)  Follow Chlorhexidine [employer] body wash instructions provided to you during PAT appointment. Begin 3 days prior to surgery. Do not shave or trim anywhere 24 hours before surgery  Wear your hair loose or down; no pony-tails, buns, or metal hair clips  Wear loose, comfortable, clean clothes  Wear glasses instead of contacts. Bring a case to keep your glasses safe. Leave money, valuables, and jewelry, including body piercings, at home  If you were given an [place], bring it on day of surgery. Going Home - or Spending the Night SAME-DAY SURGERY: You must have a responsible adult drive you home and stay with you 24 hours after surgery. You may not drive for 24 hours after surgery. ADMITS: If your doctor is keeping you in the hospital after surgery, leave personal belongings/luggage in your car until you have a hospital room number. Hospital discharge time is 12 noon  Drivers must be here before 12 noon unless you are told differently   Special Instructions Bring your inhaler with you the day of surgery. Free  parking available from 7am until 5pm.       Follow all instructions so your surgery wont be cancelled. Please, be on time. If a situation occurs and you are delayed the day of surgery, call [phone]. If your physical condition changes (like a fever, cold, flu, etc.) call your surgeon. Home medication(s) reviewed and verified via LIST and VERBAL   during PAT appointment. The patient was contacted by IN-PERSON  The patient verbalizes understanding of all instructions and  DOES NOT   need reinforcement.

## 2023-02-23 LAB — CREAT BLD-MCNC: 1.2 MG/DL (ref 0.6–1.3)

## 2023-02-24 LAB
ATRIAL RATE: 86 BPM
CALCULATED P AXIS, ECG09: 27 DEGREES
CALCULATED R AXIS, ECG10: 24 DEGREES
CALCULATED T AXIS, ECG11: 35 DEGREES
DIAGNOSIS, 93000: NORMAL
P-R INTERVAL, ECG05: 134 MS
Q-T INTERVAL, ECG07: 358 MS
QRS DURATION, ECG06: 72 MS
QTC CALCULATION (BEZET), ECG08: 428 MS
VENTRICULAR RATE, ECG03: 86 BPM

## 2023-02-28 ENCOUNTER — ANESTHESIA EVENT (OUTPATIENT)
Dept: SURGERY | Age: 88
DRG: 269 | End: 2023-02-28
Payer: MEDICARE

## 2023-03-02 ENCOUNTER — HOSPITAL ENCOUNTER (INPATIENT)
Age: 88
LOS: 1 days | Discharge: HOME HEALTH | DRG: 269 | End: 2023-03-03
Payer: MEDICARE

## 2023-03-02 ENCOUNTER — ANESTHESIA (OUTPATIENT)
Dept: SURGERY | Age: 88
DRG: 269 | End: 2023-03-02
Payer: MEDICARE

## 2023-03-02 ENCOUNTER — APPOINTMENT (OUTPATIENT)
Dept: GENERAL RADIOLOGY | Age: 88
DRG: 269 | End: 2023-03-02
Payer: MEDICARE

## 2023-03-02 DIAGNOSIS — I71.43 INFRARENAL ABDOMINAL AORTIC ANEURYSM (AAA) WITHOUT RUPTURE: Primary | ICD-10-CM

## 2023-03-02 PROBLEM — I71.9 AA (AORTIC ANEURYSM) (HCC): Status: ACTIVE | Noted: 2023-03-02

## 2023-03-02 PROCEDURE — 94664 DEMO&/EVAL PT USE INHALER: CPT

## 2023-03-02 PROCEDURE — 77030028837 HC SYR ANGI PWR INJ COEU -A

## 2023-03-02 PROCEDURE — 77030016708 HC CATH ANGI DX SUPT2 CARD -B

## 2023-03-02 PROCEDURE — 74011000250 HC RX REV CODE- 250: Performed by: NURSE ANESTHETIST, CERTIFIED REGISTERED

## 2023-03-02 PROCEDURE — C1725 CATH, TRANSLUMIN NON-LASER: HCPCS

## 2023-03-02 PROCEDURE — 74011250636 HC RX REV CODE- 250/636

## 2023-03-02 PROCEDURE — 94640 AIRWAY INHALATION TREATMENT: CPT

## 2023-03-02 PROCEDURE — 77030002933 HC SUT MCRYL J&J -A

## 2023-03-02 PROCEDURE — 76060000036 HC ANESTHESIA 2.5 TO 3 HR

## 2023-03-02 PROCEDURE — 77030036834 HC APPL HELI-FX W ENDOANCHR CASS MEDT -J

## 2023-03-02 PROCEDURE — 76210000016 HC OR PH I REC 1 TO 1.5 HR

## 2023-03-02 PROCEDURE — 77030019940 HC BLNKT HYPOTHRM STRY -B

## 2023-03-02 PROCEDURE — 77030040922 HC BLNKT HYPOTHRM STRY -A

## 2023-03-02 PROCEDURE — C1769 GUIDE WIRE: HCPCS

## 2023-03-02 PROCEDURE — 74011000250 HC RX REV CODE- 250

## 2023-03-02 PROCEDURE — 77030040361 HC SLV COMPR DVT MDII -B

## 2023-03-02 PROCEDURE — 77030031139 HC SUT VCRL2 J&J -A

## 2023-03-02 PROCEDURE — C1894 INTRO/SHEATH, NON-LASER: HCPCS

## 2023-03-02 PROCEDURE — 77030002987 HC SUT PROL J&J -B

## 2023-03-02 PROCEDURE — 77030018673

## 2023-03-02 PROCEDURE — 77030026438 HC STYL ET INTUB CARD -A: Performed by: ANESTHESIOLOGY

## 2023-03-02 PROCEDURE — 77030013797 HC KT TRNSDUC PRSSR EDWD -A: Performed by: ANESTHESIOLOGY

## 2023-03-02 PROCEDURE — 77030002986 HC SUT PROL J&J -A

## 2023-03-02 PROCEDURE — 77030008684 HC TU ET CUF COVD -B: Performed by: ANESTHESIOLOGY

## 2023-03-02 PROCEDURE — C1768 GRAFT, VASCULAR: HCPCS

## 2023-03-02 PROCEDURE — 77030013079 HC BLNKT BAIR HGGR 3M -A: Performed by: ANESTHESIOLOGY

## 2023-03-02 PROCEDURE — 2709999900 HC NON-CHARGEABLE SUPPLY

## 2023-03-02 PROCEDURE — 77030004561 HC CATH ANGI DX COBRA ANGI -B

## 2023-03-02 PROCEDURE — 74011250636 HC RX REV CODE- 250/636: Performed by: NURSE ANESTHETIST, CERTIFIED REGISTERED

## 2023-03-02 PROCEDURE — 77030010507 HC ADH SKN DERMBND J&J -B

## 2023-03-02 PROCEDURE — 74011000636 HC RX REV CODE- 636

## 2023-03-02 PROCEDURE — 74011250637 HC RX REV CODE- 250/637

## 2023-03-02 PROCEDURE — 76010000172 HC OR TIME 2.5 TO 3 HR INTENSV-TIER 1

## 2023-03-02 PROCEDURE — 77030014647 HC SEAL FBRN TISSL BAXT -D

## 2023-03-02 PROCEDURE — 65270000029 HC RM PRIVATE

## 2023-03-02 PROCEDURE — 77030003704 HC NDL VASC ACC ARMD -A

## 2023-03-02 PROCEDURE — 74011250636 HC RX REV CODE- 250/636: Performed by: ANESTHESIOLOGY

## 2023-03-02 PROCEDURE — 77030005513 HC CATH URETH FOL11 MDII -B

## 2023-03-02 DEVICE — IMPLANTABLE DEVICE: Type: IMPLANTABLE DEVICE | Site: COMMON ILIAC | Status: FUNCTIONAL

## 2023-03-02 DEVICE — IMPLANTABLE DEVICE: Type: IMPLANTABLE DEVICE | Site: AORTA | Status: FUNCTIONAL

## 2023-03-02 RX ORDER — HYDROMORPHONE HYDROCHLORIDE 1 MG/ML
.25-1 INJECTION, SOLUTION INTRAMUSCULAR; INTRAVENOUS; SUBCUTANEOUS
Status: DISCONTINUED | OUTPATIENT
Start: 2023-03-02 | End: 2023-03-02

## 2023-03-02 RX ORDER — SODIUM CHLORIDE 0.9 % (FLUSH) 0.9 %
5-40 SYRINGE (ML) INJECTION AS NEEDED
Status: DISCONTINUED | OUTPATIENT
Start: 2023-03-02 | End: 2023-03-02

## 2023-03-02 RX ORDER — GLYCOPYRROLATE 0.2 MG/ML
INJECTION INTRAMUSCULAR; INTRAVENOUS AS NEEDED
Status: DISCONTINUED | OUTPATIENT
Start: 2023-03-02 | End: 2023-03-02 | Stop reason: HOSPADM

## 2023-03-02 RX ORDER — HYDROMORPHONE HYDROCHLORIDE 2 MG/ML
INJECTION, SOLUTION INTRAMUSCULAR; INTRAVENOUS; SUBCUTANEOUS AS NEEDED
Status: DISCONTINUED | OUTPATIENT
Start: 2023-03-02 | End: 2023-03-02 | Stop reason: HOSPADM

## 2023-03-02 RX ORDER — HYDROCODONE BITARTRATE AND ACETAMINOPHEN 5; 325 MG/1; MG/1
1 TABLET ORAL
Status: DISCONTINUED | OUTPATIENT
Start: 2023-03-02 | End: 2023-03-03 | Stop reason: HOSPADM

## 2023-03-02 RX ORDER — FENTANYL CITRATE 50 UG/ML
INJECTION, SOLUTION INTRAMUSCULAR; INTRAVENOUS AS NEEDED
Status: DISCONTINUED | OUTPATIENT
Start: 2023-03-02 | End: 2023-03-02 | Stop reason: HOSPADM

## 2023-03-02 RX ORDER — NEOSTIGMINE METHYLSULFATE 1 MG/ML
INJECTION, SOLUTION INTRAVENOUS AS NEEDED
Status: DISCONTINUED | OUTPATIENT
Start: 2023-03-02 | End: 2023-03-02 | Stop reason: HOSPADM

## 2023-03-02 RX ORDER — CAPTOPRIL 25 MG/1
25 TABLET ORAL
Status: DISCONTINUED | OUTPATIENT
Start: 2023-03-02 | End: 2023-03-03 | Stop reason: HOSPADM

## 2023-03-02 RX ORDER — SODIUM CHLORIDE 0.9 % (FLUSH) 0.9 %
5-40 SYRINGE (ML) INJECTION AS NEEDED
Status: DISCONTINUED | OUTPATIENT
Start: 2023-03-02 | End: 2023-03-02 | Stop reason: HOSPADM

## 2023-03-02 RX ORDER — MIDAZOLAM HYDROCHLORIDE 1 MG/ML
INJECTION, SOLUTION INTRAMUSCULAR; INTRAVENOUS AS NEEDED
Status: DISCONTINUED | OUTPATIENT
Start: 2023-03-02 | End: 2023-03-02 | Stop reason: HOSPADM

## 2023-03-02 RX ORDER — SODIUM CHLORIDE 0.9 % (FLUSH) 0.9 %
5-40 SYRINGE (ML) INJECTION EVERY 8 HOURS
Status: DISCONTINUED | OUTPATIENT
Start: 2023-03-02 | End: 2023-03-03 | Stop reason: HOSPADM

## 2023-03-02 RX ORDER — PHENYLEPHRINE HCL IN 0.9% NACL 0.4MG/10ML
SYRINGE (ML) INTRAVENOUS AS NEEDED
Status: DISCONTINUED | OUTPATIENT
Start: 2023-03-02 | End: 2023-03-02 | Stop reason: HOSPADM

## 2023-03-02 RX ORDER — GLUCOSAM/CHONDRO/HERB 149/HYAL 750-100 MG
2 TABLET ORAL DAILY
Status: DISCONTINUED | OUTPATIENT
Start: 2023-03-03 | End: 2023-03-03 | Stop reason: HOSPADM

## 2023-03-02 RX ORDER — DIPHENHYDRAMINE HYDROCHLORIDE 50 MG/ML
12.5 INJECTION, SOLUTION INTRAMUSCULAR; INTRAVENOUS AS NEEDED
Status: DISCONTINUED | OUTPATIENT
Start: 2023-03-02 | End: 2023-03-02

## 2023-03-02 RX ORDER — HYDROCHLOROTHIAZIDE 25 MG/1
25 TABLET ORAL EVERY MORNING
Status: DISCONTINUED | OUTPATIENT
Start: 2023-03-03 | End: 2023-03-03 | Stop reason: HOSPADM

## 2023-03-02 RX ORDER — AMLODIPINE BESYLATE 5 MG/1
5 TABLET ORAL EVERY MORNING
Status: DISCONTINUED | OUTPATIENT
Start: 2023-03-03 | End: 2023-03-03 | Stop reason: HOSPADM

## 2023-03-02 RX ORDER — SODIUM CHLORIDE 0.9 % (FLUSH) 0.9 %
5-40 SYRINGE (ML) INJECTION AS NEEDED
Status: DISCONTINUED | OUTPATIENT
Start: 2023-03-02 | End: 2023-03-03 | Stop reason: HOSPADM

## 2023-03-02 RX ORDER — HEPARIN SODIUM 5000 [USP'U]/ML
5000 INJECTION, SOLUTION INTRAVENOUS; SUBCUTANEOUS EVERY 8 HOURS
Status: DISCONTINUED | OUTPATIENT
Start: 2023-03-02 | End: 2023-03-03 | Stop reason: HOSPADM

## 2023-03-02 RX ORDER — SODIUM CHLORIDE, SODIUM LACTATE, POTASSIUM CHLORIDE, CALCIUM CHLORIDE 600; 310; 30; 20 MG/100ML; MG/100ML; MG/100ML; MG/100ML
125 INJECTION, SOLUTION INTRAVENOUS CONTINUOUS
Status: DISCONTINUED | OUTPATIENT
Start: 2023-03-02 | End: 2023-03-02 | Stop reason: HOSPADM

## 2023-03-02 RX ORDER — ALBUTEROL SULFATE 0.83 MG/ML
2.5 SOLUTION RESPIRATORY (INHALATION) AS NEEDED
Status: DISCONTINUED | OUTPATIENT
Start: 2023-03-02 | End: 2023-03-02

## 2023-03-02 RX ORDER — DEXTROSE, SODIUM CHLORIDE, AND POTASSIUM CHLORIDE 5; .45; .15 G/100ML; G/100ML; G/100ML
75 INJECTION INTRAVENOUS CONTINUOUS
Status: DISCONTINUED | OUTPATIENT
Start: 2023-03-02 | End: 2023-03-03

## 2023-03-02 RX ORDER — PROTAMINE SULFATE 10 MG/ML
INJECTION, SOLUTION INTRAVENOUS AS NEEDED
Status: DISCONTINUED | OUTPATIENT
Start: 2023-03-02 | End: 2023-03-02 | Stop reason: HOSPADM

## 2023-03-02 RX ORDER — FENTANYL CITRATE 50 UG/ML
25 INJECTION, SOLUTION INTRAMUSCULAR; INTRAVENOUS
Status: DISCONTINUED | OUTPATIENT
Start: 2023-03-02 | End: 2023-03-02

## 2023-03-02 RX ORDER — SODIUM CHLORIDE, SODIUM LACTATE, POTASSIUM CHLORIDE, CALCIUM CHLORIDE 600; 310; 30; 20 MG/100ML; MG/100ML; MG/100ML; MG/100ML
125 INJECTION, SOLUTION INTRAVENOUS CONTINUOUS
Status: DISCONTINUED | OUTPATIENT
Start: 2023-03-02 | End: 2023-03-02

## 2023-03-02 RX ORDER — CETIRIZINE HYDROCHLORIDE 10 MG/1
10 TABLET ORAL DAILY
Status: DISCONTINUED | OUTPATIENT
Start: 2023-03-03 | End: 2023-03-03 | Stop reason: HOSPADM

## 2023-03-02 RX ORDER — PRAVASTATIN SODIUM 20 MG/1
40 TABLET ORAL
Status: DISCONTINUED | OUTPATIENT
Start: 2023-03-02 | End: 2023-03-03 | Stop reason: HOSPADM

## 2023-03-02 RX ORDER — SODIUM CHLORIDE 0.9 % (FLUSH) 0.9 %
5-40 SYRINGE (ML) INJECTION EVERY 8 HOURS
Status: DISCONTINUED | OUTPATIENT
Start: 2023-03-02 | End: 2023-03-02

## 2023-03-02 RX ORDER — SODIUM CHLORIDE 0.9 % (FLUSH) 0.9 %
5-40 SYRINGE (ML) INJECTION EVERY 8 HOURS
Status: DISCONTINUED | OUTPATIENT
Start: 2023-03-02 | End: 2023-03-02 | Stop reason: HOSPADM

## 2023-03-02 RX ORDER — LIDOCAINE HYDROCHLORIDE 20 MG/ML
INJECTION, SOLUTION EPIDURAL; INFILTRATION; INTRACAUDAL; PERINEURAL AS NEEDED
Status: DISCONTINUED | OUTPATIENT
Start: 2023-03-02 | End: 2023-03-02 | Stop reason: HOSPADM

## 2023-03-02 RX ORDER — DEXAMETHASONE SODIUM PHOSPHATE 4 MG/ML
INJECTION, SOLUTION INTRA-ARTICULAR; INTRALESIONAL; INTRAMUSCULAR; INTRAVENOUS; SOFT TISSUE AS NEEDED
Status: DISCONTINUED | OUTPATIENT
Start: 2023-03-02 | End: 2023-03-02 | Stop reason: HOSPADM

## 2023-03-02 RX ORDER — NALOXONE HYDROCHLORIDE 0.4 MG/ML
0.04 INJECTION, SOLUTION INTRAMUSCULAR; INTRAVENOUS; SUBCUTANEOUS
Status: DISCONTINUED | OUTPATIENT
Start: 2023-03-02 | End: 2023-03-02 | Stop reason: HOSPADM

## 2023-03-02 RX ORDER — IPRATROPIUM BROMIDE 0.5 MG/2.5ML
0.5 SOLUTION RESPIRATORY (INHALATION)
Status: DISCONTINUED | OUTPATIENT
Start: 2023-03-02 | End: 2023-03-03 | Stop reason: HOSPADM

## 2023-03-02 RX ORDER — VANCOMYCIN HYDROCHLORIDE 1 G/20ML
1 INJECTION, POWDER, LYOPHILIZED, FOR SOLUTION INTRAVENOUS ONCE
Status: DISCONTINUED | OUTPATIENT
Start: 2023-03-02 | End: 2023-03-02 | Stop reason: SDUPTHER

## 2023-03-02 RX ORDER — HYDROMORPHONE HYDROCHLORIDE 1 MG/ML
1 INJECTION, SOLUTION INTRAMUSCULAR; INTRAVENOUS; SUBCUTANEOUS
Status: DISCONTINUED | OUTPATIENT
Start: 2023-03-02 | End: 2023-03-03 | Stop reason: HOSPADM

## 2023-03-02 RX ORDER — ARFORMOTEROL TARTRATE 15 UG/2ML
15 SOLUTION RESPIRATORY (INHALATION)
Status: DISCONTINUED | OUTPATIENT
Start: 2023-03-02 | End: 2023-03-03 | Stop reason: HOSPADM

## 2023-03-02 RX ORDER — ONDANSETRON 2 MG/ML
4 INJECTION INTRAMUSCULAR; INTRAVENOUS AS NEEDED
Status: DISCONTINUED | OUTPATIENT
Start: 2023-03-02 | End: 2023-03-02

## 2023-03-02 RX ORDER — ROCURONIUM BROMIDE 10 MG/ML
INJECTION, SOLUTION INTRAVENOUS AS NEEDED
Status: DISCONTINUED | OUTPATIENT
Start: 2023-03-02 | End: 2023-03-02 | Stop reason: HOSPADM

## 2023-03-02 RX ORDER — LIDOCAINE HYDROCHLORIDE 10 MG/ML
0.1 INJECTION, SOLUTION EPIDURAL; INFILTRATION; INTRACAUDAL; PERINEURAL AS NEEDED
Status: DISCONTINUED | OUTPATIENT
Start: 2023-03-02 | End: 2023-03-02 | Stop reason: HOSPADM

## 2023-03-02 RX ORDER — ACETAMINOPHEN 500 MG
1000 TABLET ORAL
Status: DISCONTINUED | OUTPATIENT
Start: 2023-03-02 | End: 2023-03-03 | Stop reason: HOSPADM

## 2023-03-02 RX ORDER — FLUMAZENIL 0.1 MG/ML
0.2 INJECTION INTRAVENOUS
Status: DISCONTINUED | OUTPATIENT
Start: 2023-03-02 | End: 2023-03-02 | Stop reason: HOSPADM

## 2023-03-02 RX ORDER — GUAIFENESIN 100 MG/5ML
81 LIQUID (ML) ORAL DAILY
Status: DISCONTINUED | OUTPATIENT
Start: 2023-03-03 | End: 2023-03-03 | Stop reason: HOSPADM

## 2023-03-02 RX ORDER — HEPARIN SODIUM 1000 [USP'U]/ML
INJECTION, SOLUTION INTRAVENOUS; SUBCUTANEOUS AS NEEDED
Status: DISCONTINUED | OUTPATIENT
Start: 2023-03-02 | End: 2023-03-02 | Stop reason: HOSPADM

## 2023-03-02 RX ORDER — PROPOFOL 10 MG/ML
INJECTION, EMULSION INTRAVENOUS AS NEEDED
Status: DISCONTINUED | OUTPATIENT
Start: 2023-03-02 | End: 2023-03-02 | Stop reason: HOSPADM

## 2023-03-02 RX ORDER — ONDANSETRON 2 MG/ML
INJECTION INTRAMUSCULAR; INTRAVENOUS AS NEEDED
Status: DISCONTINUED | OUTPATIENT
Start: 2023-03-02 | End: 2023-03-02 | Stop reason: HOSPADM

## 2023-03-02 RX ADMIN — ROCURONIUM BROMIDE 40 MG: 10 INJECTION INTRAVENOUS at 07:43

## 2023-03-02 RX ADMIN — FENTANYL CITRATE 25 MCG: 50 INJECTION, SOLUTION INTRAMUSCULAR; INTRAVENOUS at 07:30

## 2023-03-02 RX ADMIN — Medication 10 ML: at 22:15

## 2023-03-02 RX ADMIN — SODIUM CHLORIDE, POTASSIUM CHLORIDE, SODIUM LACTATE AND CALCIUM CHLORIDE: 600; 310; 30; 20 INJECTION, SOLUTION INTRAVENOUS at 07:42

## 2023-03-02 RX ADMIN — LIDOCAINE HYDROCHLORIDE 40 MG: 20 INJECTION, SOLUTION EPIDURAL; INFILTRATION; INTRACAUDAL; PERINEURAL at 07:43

## 2023-03-02 RX ADMIN — PROPOFOL 120 MG: 10 INJECTION, EMULSION INTRAVENOUS at 07:43

## 2023-03-02 RX ADMIN — MIDAZOLAM HYDROCHLORIDE 1 MG: 1 INJECTION, SOLUTION INTRAMUSCULAR; INTRAVENOUS at 07:30

## 2023-03-02 RX ADMIN — HEPARIN SODIUM 5000 UNITS: 5000 INJECTION INTRAVENOUS; SUBCUTANEOUS at 15:00

## 2023-03-02 RX ADMIN — ROCURONIUM BROMIDE 10 MG: 10 INJECTION INTRAVENOUS at 08:50

## 2023-03-02 RX ADMIN — ONDANSETRON HYDROCHLORIDE 4 MG: 2 SOLUTION INTRAMUSCULAR; INTRAVENOUS at 07:30

## 2023-03-02 RX ADMIN — VANCOMYCIN HYDROCHLORIDE 1000 MG: 1 INJECTION, POWDER, LYOPHILIZED, FOR SOLUTION INTRAVENOUS at 07:43

## 2023-03-02 RX ADMIN — Medication 40 MCG: at 08:22

## 2023-03-02 RX ADMIN — IPRATROPIUM BROMIDE 0.5 MG: 0.5 SOLUTION RESPIRATORY (INHALATION) at 16:56

## 2023-03-02 RX ADMIN — HEPARIN SODIUM 5000 UNITS: 5000 INJECTION INTRAVENOUS; SUBCUTANEOUS at 22:15

## 2023-03-02 RX ADMIN — IPRATROPIUM BROMIDE 0.5 MG: 0.5 SOLUTION RESPIRATORY (INHALATION) at 20:42

## 2023-03-02 RX ADMIN — PRAVASTATIN SODIUM 40 MG: 20 TABLET ORAL at 22:15

## 2023-03-02 RX ADMIN — LIDOCAINE HYDROCHLORIDE 40 MG: 20 INJECTION, SOLUTION EPIDURAL; INFILTRATION; INTRACAUDAL; PERINEURAL at 08:26

## 2023-03-02 RX ADMIN — ACETAMINOPHEN 1000 MG: 500 TABLET ORAL at 15:08

## 2023-03-02 RX ADMIN — PROTAMINE SULFATE 25 MG: 10 INJECTION, SOLUTION INTRAVENOUS at 09:35

## 2023-03-02 RX ADMIN — SODIUM CHLORIDE 15 MCG/MIN: 9 INJECTION, SOLUTION INTRAVENOUS at 08:47

## 2023-03-02 RX ADMIN — HEPARIN SODIUM 7000 UNITS: 1000 INJECTION, SOLUTION INTRAVENOUS; SUBCUTANEOUS at 08:17

## 2023-03-02 RX ADMIN — FENTANYL CITRATE 50 MCG: 50 INJECTION, SOLUTION INTRAMUSCULAR; INTRAVENOUS at 08:05

## 2023-03-02 RX ADMIN — NEOSTIGMINE METHYLSULFATE 3 MG: 1 INJECTION, SOLUTION INTRAVENOUS at 09:37

## 2023-03-02 RX ADMIN — FENTANYL CITRATE 50 MCG: 50 INJECTION, SOLUTION INTRAMUSCULAR; INTRAVENOUS at 07:43

## 2023-03-02 RX ADMIN — FENTANYL CITRATE 25 MCG: 50 INJECTION, SOLUTION INTRAMUSCULAR; INTRAVENOUS at 08:35

## 2023-03-02 RX ADMIN — HYDROCODONE BITARTRATE AND ACETAMINOPHEN 1 TABLET: 5; 325 TABLET ORAL at 23:00

## 2023-03-02 RX ADMIN — GLYCOPYRROLATE 0.4 MG: 0.2 INJECTION INTRAMUSCULAR; INTRAVENOUS at 09:37

## 2023-03-02 RX ADMIN — POTASSIUM CHLORIDE, DEXTROSE MONOHYDRATE AND SODIUM CHLORIDE 75 ML/HR: 150; 5; 450 INJECTION, SOLUTION INTRAVENOUS at 23:55

## 2023-03-02 RX ADMIN — DEXAMETHASONE SODIUM PHOSPHATE 4 MG: 4 INJECTION, SOLUTION INTRAMUSCULAR; INTRAVENOUS at 07:30

## 2023-03-02 RX ADMIN — HYDROMORPHONE HYDROCHLORIDE 0.5 MG: 2 INJECTION, SOLUTION INTRAMUSCULAR; INTRAVENOUS; SUBCUTANEOUS at 09:50

## 2023-03-02 RX ADMIN — SODIUM CHLORIDE 25 MCG/MIN: 9 INJECTION, SOLUTION INTRAVENOUS at 09:02

## 2023-03-02 RX ADMIN — FENTANYL CITRATE 50 MCG: 50 INJECTION, SOLUTION INTRAMUSCULAR; INTRAVENOUS at 09:31

## 2023-03-02 RX ADMIN — Medication 40 MCG: at 08:37

## 2023-03-02 RX ADMIN — Medication 10 ML: at 14:00

## 2023-03-02 RX ADMIN — Medication 80 MCG: at 07:49

## 2023-03-02 RX ADMIN — POTASSIUM CHLORIDE, DEXTROSE MONOHYDRATE AND SODIUM CHLORIDE 75 ML/HR: 150; 5; 450 INJECTION, SOLUTION INTRAVENOUS at 11:06

## 2023-03-02 RX ADMIN — FENTANYL CITRATE 50 MCG: 50 INJECTION, SOLUTION INTRAMUSCULAR; INTRAVENOUS at 08:50

## 2023-03-02 RX ADMIN — ARFORMOTEROL TARTRATE 15 MCG: 15 SOLUTION RESPIRATORY (INHALATION) at 20:42

## 2023-03-02 RX ADMIN — HYDROMORPHONE HYDROCHLORIDE 0.5 MG: 2 INJECTION, SOLUTION INTRAMUSCULAR; INTRAVENOUS; SUBCUTANEOUS at 09:40

## 2023-03-02 NOTE — PERIOP NOTES
Pt awake and alert, denies pain. Report received for shift relief from [employer]. Bilat groin incisions dry without hematomas.

## 2023-03-02 NOTE — H&P
Vascular Surgery History & Physical    Subjective:     Marybel Larry is a 82 y.o.  female with AAA     Past Medical History:   Diagnosis Date    Abdominal aneurysm     Arthritis     Chronic obstructive pulmonary disease ([address]     Hyperlipidemia     Hypertension       Past Surgical History:   Procedure Laterality Date    COLONOSCOPY N/A 2017    COLONOSCOPY, EGD performed by Anabell Alberts MD at [address]    HX HEENT Bilateral 2023    cateract surgery    HX ORTHOPAEDIC Left     ORIF left wrist     Family History   Problem Relation Age of Onset    Cancer Brother       Social History     Tobacco Use    Smoking status: Former     Packs/day: 1.00     Years: 35.00     Pack years: 35.00     Types: Cigarettes     Quit date: 2015     Years since quittin.2    Smokeless tobacco: Never   Substance Use Topics    Alcohol use: No       Prior to Admission medications    Medication Sig Start Date End Date Taking? Authorizing Provider   loratadine (CLARITIN) 10 mg tablet Take 10 mg by mouth Every morning. Yes Provider, Historical   tiotropium-olodateroL (Stiolto Respimat) 2.5-2.5 mcg/actuation inhaler Take 2 Puffs by inhalation Every morning. Yes Provider, Historical   OTHER Administer 1 Drop to both eyes Every morning. Prednicort eye drops   Yes Provider, Historical   aspirin 81 mg chewable tablet Take 1 tablet by mouth daily. 14  Yes Estrella Awad MD   amLODIPine (NORVASC) 5 mg tablet Take 5 mg by mouth Every morning. Yes Provider, Historical   benazepril (LOTENSIN) 20 mg tablet Take 20 mg by mouth Every morning. Yes Provider, Historical   pravastatin (PRAVACHOL) 40 mg tablet Take 40 mg by mouth nightly. Yes Other, MD Vanessa   hydrochlorothiazide (HYDRODIURIL) 25 mg tablet Take 25 mg by mouth Every morning. Yes Other, MD Vanessa   cholecalciferol, vitamin d3, 10 mcg (400 unit) cap Take 1 Tablet by mouth Every morning.     Provider, Historical   fish oil-omega-3 fatty acids 340-1,000 mg capsule Take 2 Capsules by mouth daily. Provider, Historical     Allergies   Allergen Reactions    Tetracycline Other (comments)     Mouth soreness        Review of Systems   All other systems reviewed and are negative. Objective:     Patient Vitals for the past 24 hrs:   BP Temp Pulse Resp SpO2 Height Weight   03/02/23 0606 (!) 141/61 97.7 °F (36.5 °C) 87 12 95 % -- 70.9 kg (156 lb 4.9 oz)   03/02/23 0552 -- -- -- -- -- 5' 1\" (1.549 m) 70.9 kg (156 lb 4.9 oz)       Physical Exam  Cardiovascular:      Rate and Rhythm: Normal rate and regular rhythm. Heart sounds: Normal heart sounds. Pulmonary:      Breath sounds: Normal breath sounds. Data Review:   No results found for this or any previous visit (from the past 24 hour(s)). Assessment/Plan:    To OR for endograft repair    Signed By: Rishi Larson MD     March 2, 2023

## 2023-03-02 NOTE — PROGRESS NOTES
1800 TRANSFER - IN REPORT:    Verbal report received from PACU (name) on Magnolia Berger  being received from [facility] (unit) for routine progression of care      Report consisted of patients Situation, Background, Assessment and   Recommendations(SBAR). Information from the following report(s) SBAR, ED Summary, Intake/Output, MAR, Recent Results, Med Rec Status, and Cardiac Rhythm NSR  was reviewed with the receiving nurse. Opportunity for questions and clarification was provided. Assessment completed upon patients arrival to unit and care assumed. Primary Nurse Nieves Son RN and Yoandy Luciano RN performed a dual skin assessment on this patient No impairment noted  Edvin score is see flowsheet. 1900 Bedside and Verbal shift change report given to Higinio Canchola RN (oncoming nurse) by Jose Eden RN (offgoing nurse). Report included the following information SBAR, ED Summary, Intake/Output, MAR, Recent Results, Med Rec Status, and Cardiac Rhythm NSR .

## 2023-03-02 NOTE — BRIEF OP NOTE
Brief Postoperative Note    Patient: Dina Medina  YOB: 1935  MRN: [id number]    Date of Procedure: 3/2/2023     Pre-Op Diagnosis: ABDOMINAL AORTIC ANEURYSM    Post-Op Diagnosis: Same as preoperative diagnosis. Procedure(s):  ENDOVASCULAR ABDOMINAL AORTIC ANEURYSM REPAIR    Surgeon(s): Kelly Gayle MD    Surgical Assistant: Surg Asst-1: Neil Hopkins I    Anesthesia: General     Estimated Blood Loss (mL): less than 042     Complications: None    Specimens: * No specimens in log *     Implants:   Implant Name Type Inv.  Item Serial No.  Lot No. LRB No. Used Action   Endurant II Stent Graft System Bifurcated   [id number] [employer] NA Left 1 Implanted   Endurant II Stent Graft System Limb   [id number] [employer] NA Right 1 Implanted   Endurant II Stent Graft System Limb   [id number] [employer] NA Left 1 Implanted       Drains: * No LDAs found *    Findings: 0    Electronically Signed by Karla Garcia MD on 3/2/2023 at 10:57 AM

## 2023-03-02 NOTE — ANESTHESIA POSTPROCEDURE EVALUATION
Procedure(s):  ENDOVASCULAR ABDOMINAL AORTIC ANEURYSM REPAIR. general    Anesthesia Post Evaluation      Multimodal analgesia: multimodal analgesia not used between 6 hours prior to anesthesia start to PACU discharge  Patient location during evaluation: PACU  Patient participation: complete - patient participated  Level of consciousness: awake and alert  Pain score: 2  Pain management: satisfactory to patient  Airway patency: patent  Anesthetic complications: no  Cardiovascular status: acceptable  Respiratory status: acceptable  Hydration status: acceptable  Post anesthesia nausea and vomiting:  none  Final Post Anesthesia Temperature Assessment:  Normothermia (36.0-37.5 degrees C)      INITIAL Post-op Vital signs:   Vitals Value Taken Time   /57 03/02/23 1200   Temp 36.4 °C (97.5 °F) 03/02/23 1005   Pulse 65 03/02/23 1203   Resp 13 03/02/23 1203   SpO2 97 % 03/02/23 1203   Vitals shown include unvalidated device data.

## 2023-03-02 NOTE — ANESTHESIA PREPROCEDURE EVALUATION
Anesthetic History   No history of anesthetic complications            Review of Systems / Medical History  Patient summary reviewed, nursing notes reviewed and pertinent labs reviewed    Pulmonary    COPD: mild               Neuro/Psych   Within defined limits           Cardiovascular    Hypertension          Hyperlipidemia    Exercise tolerance: >4 METS  Comments: Not on beta blocker   GI/Hepatic/Renal         Renal disease: CRI       Endo/Other        Arthritis     Other Findings   Comments: Abdominal aneurysm            Physical Exam    Airway  Mallampati: III  TM Distance: 4 - 6 cm  Neck ROM: normal range of motion   Mouth opening: Normal     Cardiovascular  Regular rate and rhythm,  S1 and S2 normal,  no murmur, click, rub, or gallop  Rhythm: regular  Rate: normal         Dental    Dentition: Full upper dentures and Full lower dentures     Pulmonary  Breath sounds clear to auscultation               Abdominal  GI exam deferred       Other Findings            Anesthetic Plan    ASA: 3  Anesthesia type: general          Induction: Intravenous  Anesthetic plan and risks discussed with: Patient

## 2023-03-02 NOTE — PERIOP NOTES
TRANSFER - IN REPORT:    Verbal report received from Elo Nielsen rn on Irving Alejandra  being received from PACU  for routine post - op      Report consisted of patients Situation, Background, Assessment and   Recommendations(SBAR). Information from the following report(s) SBAR, OR Summary, Procedure Summary, Intake/Output, MAR, Recent Results, and Cardiac Rhythm SR  was reviewed with the receiving nurse. Opportunity for questions and clarification was provided. Assessment completed upon patients arrival to unit and care assumed.

## 2023-03-03 VITALS
OXYGEN SATURATION: 95 % | WEIGHT: 160.5 LBS | HEART RATE: 96 BPM | BODY MASS INDEX: 30.3 KG/M2 | DIASTOLIC BLOOD PRESSURE: 50 MMHG | RESPIRATION RATE: 18 BRPM | HEIGHT: 61 IN | TEMPERATURE: 98.7 F | SYSTOLIC BLOOD PRESSURE: 103 MMHG

## 2023-03-03 LAB
BASOPHILS # BLD: 0 K/UL (ref 0–0.1)
BASOPHILS NFR BLD: 0 % (ref 0–1)
DIFFERENTIAL METHOD BLD: ABNORMAL
EOSINOPHIL # BLD: 0 K/UL (ref 0–0.4)
EOSINOPHIL NFR BLD: 0 % (ref 0–7)
ERYTHROCYTE [DISTWIDTH] IN BLOOD BY AUTOMATED COUNT: 13 % (ref 11.5–14.5)
GLUCOSE BLD STRIP.AUTO-MCNC: 130 MG/DL (ref 65–117)
HCT VFR BLD AUTO: 34.8 % (ref 35–47)
HGB BLD-MCNC: 11.6 G/DL (ref 11.5–16)
IMM GRANULOCYTES # BLD AUTO: 0.1 K/UL (ref 0–0.04)
IMM GRANULOCYTES NFR BLD AUTO: 0 % (ref 0–0.5)
LYMPHOCYTES # BLD: 0.9 K/UL (ref 0.8–3.5)
LYMPHOCYTES NFR BLD: 8 % (ref 12–49)
MCH RBC QN AUTO: 33.2 PG (ref 26–34)
MCHC RBC AUTO-ENTMCNC: 33.3 G/DL (ref 30–36.5)
MCV RBC AUTO: 99.7 FL (ref 80–99)
MONOCYTES # BLD: 1.1 K/UL (ref 0–1)
MONOCYTES NFR BLD: 10 % (ref 5–13)
NEUTS SEG # BLD: 9.3 K/UL (ref 1.8–8)
NEUTS SEG NFR BLD: 82 % (ref 32–75)
NRBC # BLD: 0 K/UL (ref 0–0.01)
NRBC BLD-RTO: 0 PER 100 WBC
PLATELET # BLD AUTO: 192 K/UL (ref 150–400)
PMV BLD AUTO: 9.5 FL (ref 8.9–12.9)
RBC # BLD AUTO: 3.49 M/UL (ref 3.8–5.2)
SERVICE CMNT-IMP: ABNORMAL
WBC # BLD AUTO: 11.4 K/UL (ref 3.6–11)

## 2023-03-03 PROCEDURE — 74011250637 HC RX REV CODE- 250/637

## 2023-03-03 PROCEDURE — 82962 GLUCOSE BLOOD TEST: CPT

## 2023-03-03 PROCEDURE — 77030029684 HC NEB SM VOL KT MONA -A

## 2023-03-03 PROCEDURE — 04V03DZ RESTRICTION OF ABDOMINAL AORTA WITH INTRALUMINAL DEVICE, PERCUTANEOUS APPROACH: ICD-10-PCS

## 2023-03-03 PROCEDURE — 97162 PT EVAL MOD COMPLEX 30 MIN: CPT

## 2023-03-03 PROCEDURE — 97116 GAIT TRAINING THERAPY: CPT

## 2023-03-03 PROCEDURE — 85025 COMPLETE CBC W/AUTO DIFF WBC: CPT

## 2023-03-03 PROCEDURE — 94640 AIRWAY INHALATION TREATMENT: CPT

## 2023-03-03 PROCEDURE — 74011000250 HC RX REV CODE- 250

## 2023-03-03 PROCEDURE — 36415 COLL VENOUS BLD VENIPUNCTURE: CPT

## 2023-03-03 PROCEDURE — B4101ZZ FLUOROSCOPY OF ABDOMINAL AORTA USING LOW OSMOLAR CONTRAST: ICD-10-PCS

## 2023-03-03 PROCEDURE — 74011250636 HC RX REV CODE- 250/636

## 2023-03-03 RX ORDER — HYDROCODONE BITARTRATE AND ACETAMINOPHEN 5; 325 MG/1; MG/1
1 TABLET ORAL
Qty: 10 TABLET | Refills: 0 | Status: SHIPPED | OUTPATIENT
Start: 2023-03-03 | End: 2023-03-06

## 2023-03-03 RX ADMIN — ASPIRIN 81 MG: 81 TABLET, CHEWABLE ORAL at 08:07

## 2023-03-03 RX ADMIN — AMLODIPINE BESYLATE 5 MG: 5 TABLET ORAL at 08:02

## 2023-03-03 RX ADMIN — IPRATROPIUM BROMIDE 0.5 MG: 0.5 SOLUTION RESPIRATORY (INHALATION) at 07:52

## 2023-03-03 RX ADMIN — IPRATROPIUM BROMIDE 0.5 MG: 0.5 SOLUTION RESPIRATORY (INHALATION) at 13:46

## 2023-03-03 RX ADMIN — Medication 10 ML: at 06:00

## 2023-03-03 RX ADMIN — HEPARIN SODIUM 5000 UNITS: 5000 INJECTION INTRAVENOUS; SUBCUTANEOUS at 08:02

## 2023-03-03 RX ADMIN — HYDROCHLOROTHIAZIDE 25 MG: 25 TABLET ORAL at 08:02

## 2023-03-03 RX ADMIN — ARFORMOTEROL TARTRATE 15 MCG: 15 SOLUTION RESPIRATORY (INHALATION) at 07:52

## 2023-03-03 RX ADMIN — OMEGA-3 FATTY ACIDS CAP 1000 MG 2 CAPSULE: 1000 CAP at 08:07

## 2023-03-03 NOTE — PROGRESS NOTES
Problem: Mobility Impaired (Adult and Pediatric)  Goal: *Acute Goals and Plan of Care (Insert Text)  Description: FUNCTIONAL STATUS PRIOR TO ADMISSION: Patient was independent and active without use of DME.    HOME SUPPORT PRIOR TO ADMISSION: The patient lived with son but did not require assist.    Physical Therapy Goals  Initiated 3/3/2023  1. Patient will move from supine to sit and sit to supine  in bed with modified independence within 7 day(s). 2.  Patient will transfer from bed to chair and chair to bed with modified independence using the least restrictive device within 7 day(s). 3.  Patient will perform sit to stand with modified independence within 7 day(s). 4.  Patient will ambulate with modified independence for 250 feet with the least restrictive device within 7 day(s). 5.  Patient will ascend/descend 4 stairs with 2 handrail(s) with minimal assistance/contact guard assist within 7 day(s). Outcome: Progressing Towards Goal  Note:   PHYSICAL THERAPY EVALUATION  Patient: Ricky Kumari (43 y.o. female)  Date: 3/3/2023  Primary Diagnosis: AA (aortic aneurysm) ([facility] [address] [I71.9]  Procedure(s) (LRB):  ENDOVASCULAR ABDOMINAL AORTIC ANEURYSM REPAIR (N/A) 1 Day Post-Op   Precautions:   Fall    ASSESSMENT  Based on the objective data described below, the patient presents with decreased endurance and pain at incision sites following AAA repair. She required RW for mobility due to antalgic gait, and able to ambulate in hallway with difficulty. Patient vitals were stable throughout- see below. Patient would benefit from home health at discharge. .    Current Level of Function Impacting Discharge (mobility/balance): without assistive device MIN A for sit-stand and unable to ambulate, with RW able to perform SBA for ambulation    Functional Outcome Measure: The patient scored 18/24 on the [facility] outcome measure.       Other factors to consider for discharge:      Patient will benefit from skilled therapy intervention to address the above noted impairments. PLAN :  Recommendations and Planned Interventions: bed mobility training, transfer training, gait training, therapeutic exercises, neuromuscular re-education, and therapeutic activities      Frequency/Duration: Patient will be followed by physical therapy:  5 times a week to address goals. Recommendation for discharge: (in order for the patient to meet his/her long term goals)  Physical therapy at least 2 days/week in the home     This discharge recommendation:  Has been made in collaboration with the attending provider and/or case management    IF patient discharges home will need the following DME: to be determined (TBD)         SUBJECTIVE:   Patient stated its burning.     OBJECTIVE DATA SUMMARY:   HISTORY:    Past Medical History:   Diagnosis Date    Abdominal aneurysm 2023    Arthritis     Chronic obstructive pulmonary disease ([facility] [address]     Hyperlipidemia     Hypertension      Past Surgical History:   Procedure Laterality Date    COLONOSCOPY N/A 12/13/2017    COLONOSCOPY, EGD performed by Marcelino Hickey MD at [address] HEENT Bilateral 01/26/2023    cateract surgery    HX ORTHOPAEDIC Left 2013    ORIF left wrist       Personal factors and/or comorbidities impacting plan of care: see above    Home Situation  Home Environment: Private residence  # Steps to Enter: 4  One/Two Story Residence: Two story, live on 1st floor  # of Interior Steps: 14  Interior Rails: Both  Lift Chair Available: No  Living Alone: No  Support Systems: Child(tony), Other Family Member(s)  Patient Expects to be Discharged to[de-identified] Home with family assistance  Current DME Used/Available at Home: Radha Crow, [employer]    EXAMINATION/PRESENTATION/DECISION MAKING:   Vitals:    03/03/23 1044 03/03/23 1050 03/03/23 1051 03/03/23 1120   BP: 129/67  131/61    BP 1 Location: Left upper arm  Left upper arm    BP Patient Position: Sitting Walking Comment: post walk    Pulse: (!) 109 (!) 132 (!) 120 (!) 102   Temp:       Resp:       Height:       Weight:       SpO2:  90% 90%        Critical Behavior:  Neurologic State: Alert  Orientation Level: Oriented X4  Cognition: Appropriate decision making, Follows commands     Hearing: Auditory  Auditory Impairment: None    Range Of Motion:  AROM: Within functional limits           PROM: Within functional limits           Strength:    Strength: Generally decreased, functional                    Tone & Sensation:                                  Coordination:  Coordination: Generally decreased, functional  Vision:      Functional Mobility:  Bed Mobility:  Rolling: Stand-by assistance  Supine to Sit: Stand-by assistance     Scooting: Stand-by assistance  Transfers:  Sit to Stand: Contact guard assistance  Stand to Sit: Contact guard assistance        Bed to Chair: Contact guard assistance              Balance:   Sitting: Intact  Standing: Impaired  Standing - Static: Constant support  Standing - Dynamic : Constant support  Ambulation/Gait Training:  Distance (ft): 200 Feet (ft)  Assistive Device: Walker, rolling;Gait belt  Ambulation - Level of Assistance: Contact guard assistance     Gait Description (WDL): Exceptions to WDL  Gait Abnormalities: Antalgic                            Functional Measure:  [employer] AM-PAC®      Basic Mobility Inpatient Short Form (6-Clicks) Version 2  How much HELP from another person do you currently need. .. (If the patient hasn't done an activity recently, how much help from another person do you think they would need if they tried?) Total A Lot A Little None   1. Turning from your back to your side while in a flat bed without using bedrails? []  1 []  2 [x]  3  []  4   2. Moving from lying on your back to sitting on the side of a flat bed without using bedrails? []  1 []  2 [x]  3  []  4   3. Moving to and from a bed to a chair (including a wheelchair)? []  1 []  2 [x]  3  []  4   4.  Standing up from a chair using your arms (e.g. wheelchair or bedside chair)? []  1 []  2 [x]  3  []  4   5. Walking in hospital room? []  1 []  2 [x]  3  []  4   6. Climbing 3-5 steps with a railing? []  1 []  2 [x]  3  []  4     Raw Score: 18/24                            Cutoff score ?171,2,3 had higher odds of discharging home with home health or need of SNF/IPR. [address], Stephanie Bunch Jama, Megan Wootenjuan Fred Sandhya Tang. Validity of the AM-PAC 6-Clicks Inpatient Daily Activity and Basic Mobility Short Forms. Physical Therapy Mar 2014, [phone] (3) 379-391; DOI: 10.2522/ptj.95656050  2. Chasity Collins. Association of AM-PAC \"6-Clicks\" Basic Mobility and Daily Activity Scores With Discharge Destination. Phys Ther. 2021 Apr 4;101(4):jlwx352. doi: 10.1093/ptj/ohbf913. PMID: 33145650. V Higinio Ross, Mariaa D, Kayden Meas, Nereyda K, Mayte S. Activity Measure for Post-Acute Care \"6-Clicks\" Basic Mobility Scores Predict Discharge Destination After Acute Care Hospitalization in [facility] Patient [facility]: A Retrospective, Observational Study. Arch Rehabil Res Clin Transl. 2022 Jul 16;4(3):818404. doi: 10.1016/j.arrct. 9197.022348. PMID: 36946957; PMCID: [id number]. 4. Meryle Quay, Katalina W, Florinda CHAVARRIA. AM-PAC Short Forms Manual 4.0. Revised 2/2020. Physical Therapy Evaluation Charge Determination   History Examination Presentation Decision-Making   MEDIUM  Complexity : 1-2 comorbidities / personal factors will impact the outcome/ POC  MEDIUM Complexity : 3 Standardized tests and measures addressing body structure, function, activity limitation and / or participation in recreation  MEDIUM Complexity : Evolving with changing characteristics  Other outcome measures [facility]  MEDIUM      Based on the above components, the patient evaluation is determined to be of the following complexity level: MEDIUM    Pain Rating:   At incision sites, with movement, at rest 0/10    Activity Tolerance: Good    After treatment patient left in no apparent distress:   Sitting in chair, Call bell within reach, and Bed / chair alarm activated    COMMUNICATION/EDUCATION:   The patients plan of care was discussed with: Registered nurse. Fall prevention education was provided and the patient/caregiver indicated understanding., Patient/family have participated as able in goal setting and plan of care. , and Patient/family agree to work toward stated goals and plan of care.     Thank you for this referral.  Brenda Rivera, PT, DPT   Time Calculation: 24 mins

## 2023-03-03 NOTE — PROGRESS NOTES
Contacted Dr. Venkata Pickens regarding current level of care. Telephone verbal order provided to transfer to telemetry.

## 2023-03-03 NOTE — PROGRESS NOTES
This patient was assisted with Intentional Toileting every 2 hours during this shift as appropriate. Documentation of ambulation and output reflected on Flowsheet as appropriate. Purposeful hourly rounding was completed using AIDET and 5Ps. Outcomes of PHR documented as they occurred. Bed alarm in use as appropriate.   Dual Suction and ambubag in place.     -Prabhakar Koo, PCT

## 2023-03-03 NOTE — PROGRESS NOTES
Reason for Admission:  AAA repair     Past Medical History:   Diagnosis Date    Abdominal aneurysm 2023    Arthritis     Chronic obstructive pulmonary disease ([facility] [address]     Hyperlipidemia     Hypertension                    RUR Score:    8%/ low risk                 Plan for utilizing home health:      none, no DME    PCP: First and Last name:  Lily Gold MD     Name of Practice:    Are you a current patient: Yes/No:  yes   Approximate date of last visit:  1 month ago   Can you participate in a virtual visit with your PCP:   yes                    Current Advanced Directive/Advance Care Plan: Full Code    Healthcare Decision Maker:   Click here to complete [address] including selection of the Healthcare Decision Maker Relationship (ie \"Primary\")           Peter Jimenez  [phone]                  Transition of Care Plan:         Chart reviewed, demographics verified. CM role and follow up discussed. Met with patient and her son at bedside, face mask on. Patient lives with her son. Patient lives in a two story home with 4 steps to enter home and 14 steps to upper level. Patient has prescription drug coverage, uses [facility] [address]. Patient is independent, drives, and provides self care. Patient performs ADLs independently. Current status:  Patient currently requiring medical management including ongoing assessment and monitoring. POD #1, AAA repair. Alert and interactive. Discharge home today, son at bedside and will transport home. PLAN:  1. Monitor patient response to treatment and recommendations. 2. Medical management continues. 3. CM needs identified:  none  4. Home with family assist.  5. Patient transport home per son at discharge. 6. CM to monitor clinical progress and disposition recommendations. Care Management Interventions  PCP Verified by CM: Yes (Dr. Hamm Ek)  Mode of Transport at Discharge:  Other (see comment) (per son)  Transition of Care Consult (CM Consult): Discharge Planning  Support Systems: Child(tony), Other Family Member(s)  Confirm Follow Up Transport: Family  The Plan for Transition of Care is Related to the Following Treatment Goals : Return Home  Discharge Location  Patient Expects to be Discharged to[de-identified] Home with family assistance    Raul James RN, MSN, Care manager

## 2023-03-03 NOTE — PROGRESS NOTES
Order received for home health nursing, PT. Home health follow up discussed with patient/family. Home health providers discussed. Patient will need provider within [facility]. Choice is [facility] or [facility] Heller, referral sent via [address] [place] [address]. [place] of Choice obtained. Await response. Accepted by [facility]. AVS updated. Care Management Interventions  PCP Verified by CM: Yes (Dr. Kun Valenzuela)  Mode of Transport at Discharge:  Other (see comment) (per son)  Transition of Care Consult (CM Consult): [address]: Yes  Support Systems: Child(tony), Other Family Member(s)  Confirm Follow Up Transport: Family  The Plan for Transition of Care is Related to the Following Treatment Goals : Return Home  The Patient and/or Patient Representative was Provided with a Choice of Provider and Agrees with the Discharge Plan?: (S) Yes  Freedom of Choice List was Provided with Basic Dialogue that Supports the Patient's Individualized Plan of Care/Goals, Treatment Preferences and Shares the Quality Data Associated with the Providers?: (S) Yes  Discharge Location  Patient Expects to be Discharged to[de-identified] Home with home health    Chriss Ramírez, RN, MSN/Care manager

## 2023-03-03 NOTE — PROGRESS NOTES
1900 Bedside shift change report given to Darek Moore RN (oncoming nurse) by Stan Kim RN (offgoing nurse). Report included the following information SBAR, Kardex, ED Summary, Procedure Summary, Intake/Output, MAR, Recent Results, Med Rec Status, Cardiac Rhythm SR, Alarm Parameters , and Quality Measures. Primary Nurse Estee Marinelli RN and Stan Kim RN performed a dual skin assessment on this patient. Impairment noted - see wound doc flow sheet. Edvin score is; see flow sheet. 0200 TRANSFER - OUT REPORT:    Verbal report given to Nikki Malone RN (name) on Claudette Ring  being transferred to [facility] (unit) for routine progression of care       Report consisted of patients Situation, Background, Assessment and   Recommendations(SBAR). Information from the following report(s) SBAR, Kardex, ED Summary, Procedure Summary, Intake/Output, MAR, Recent Results, Med Rec Status, Cardiac Rhythm SR, Alarm Parameters , and Quality Measures was reviewed with the receiving nurse. Lines:   Peripheral IV 03/02/23 Posterior;Right Forearm (Active)   Site Assessment Clean, dry, & intact 03/03/23 0218   Phlebitis Assessment 0 03/03/23 0218   Infiltration Assessment 0 03/03/23 0218   Dressing Status Clean, dry, & intact 03/03/23 0218   Dressing Type Transparent 03/03/23 0218   Hub Color/Line Status Infusing 03/03/23 0218   Action Taken Open ports on tubing capped 03/03/23 0218   Alcohol Cap Used Yes 03/03/23 0218       Peripheral IV 03/02/23 Distal;Left;Posterior Forearm (Active)   Site Assessment Clean, dry, & intact 03/03/23 0218   Phlebitis Assessment 0 03/03/23 0218   Infiltration Assessment 0 03/03/23 0218   Dressing Status Clean, dry, & intact 03/03/23 0218   Dressing Type Transparent 03/03/23 0218   Hub Color/Line Status Capped 03/03/23 0218   Action Taken Open ports on tubing capped 03/03/23 0218   Alcohol Cap Used Yes 03/03/23 2601        Opportunity for questions and clarification was provided.       Patient transported with: Monitor  Registered Nurse  Tech

## 2023-03-03 NOTE — PROGRESS NOTES
Doing great   Wants to go home which is probably reasonable if she can be mobile  Increase activity   Home later today if appropriate.

## 2023-03-03 NOTE — DISCHARGE INSTRUCTIONS
Patient Discharge Instructions    Shira Martins / [id number] : 1935    Admitted 3/2/2023 Discharged: 3/3/2023     Take Home Medications     Current Discharge Medication List        START taking these medications    Details   HYDROcodone-acetaminophen (NORCO) 5-325 mg per tablet Take 1 Tablet by mouth every four (4) hours as needed for Pain for up to 3 days. Max Daily Amount: 6 Tablets. Qty: 10 Tablet, Refills: 0    Associated Diagnoses: Infrarenal abdominal aortic aneurysm (AAA) without rupture           CONTINUE these medications which have NOT CHANGED    Details   loratadine (CLARITIN) 10 mg tablet Take 10 mg by mouth Every morning. tiotropium-olodateroL (Stiolto Respimat) 2.5-2.5 mcg/actuation inhaler Take 2 Puffs by inhalation Every morning. OTHER Administer 1 Drop to both eyes Every morning. Prednicort eye drops      aspirin 81 mg chewable tablet Take 1 tablet by mouth daily. Qty: 30 tablet, Refills: 0      amLODIPine (NORVASC) 5 mg tablet Take 5 mg by mouth Every morning. benazepril (LOTENSIN) 20 mg tablet Take 20 mg by mouth Every morning. pravastatin (PRAVACHOL) 40 mg tablet Take 40 mg by mouth nightly. hydrochlorothiazide (HYDRODIURIL) 25 mg tablet Take 25 mg by mouth Every morning. cholecalciferol, vitamin d3, 10 mcg (400 unit) cap Take 1 Tablet by mouth Every morning. fish oil-omega-3 fatty acids 340-1,000 mg capsule Take 2 Capsules by mouth daily. It is important that you take the medication exactly as they are prescribed. Keep your medication in the bottles provided by the pharmacist and keep a list of the medication names, dosages, and times to be taken in your wallet. Do not take other medications without consulting your doctor.        What to do at Home    Recommended diet: Regular Diet,     Recommended activity: Activity as tolerated,      Follow-up with Dr Gregory Narayan in 2 weeks        Information obtained by :  I understand that if any problems occur once I am at home I am to contact my physician. I understand and acknowledge receipt of the instructions indicated above.                                                                                                                                            Physician's or R.N.'s Signature                                                                  Date/Time                                                                                                                                              Patient or Representative Signature                                                          Date/Time

## 2023-03-03 NOTE — PROGRESS NOTES
Orders received, chart reviewed and patient evaluated by physical therapy. Pending progression with skilled acute physical therapy, recommend:  Physical therapy at least 2 days/week in the home     Recommend with nursing OOB to chair 3x/day and walking daily with SBA assist and walker. Thank you for completing as able in order to maintain patient strength, endurance and independence. Full evaluation to follow.       Noemi Moralez PT,DPT,NCS

## 2023-03-05 NOTE — OP NOTES
[facility]  OPERATIVE REPORT    Name:  Danish Ndiaye  MR#:  [id number]  :  1935  ACCOUNT #:  [de-identified]  DATE OF SERVICE:  2023    PREOPERATIVE DIAGNOSIS:  Abdominal aortic aneurysm. POSTOPERATIVE DIAGNOSIS:  Abdominal aortic aneurysm. PROCEDURE PERFORMED:  1. Repair of abdominal aortic aneurysm. Endograft repair of abdominal aortic aneurysm using modular bifurcated device. 2.  Bilateral open femoral artery exposures. 3.  Enhanced proximal fixation with Aptus screws proximal aortic neck. 4.  Aortogram.    SURGEON:  Aysha Garza MD    ASSISTANT:  0    ANESTHESIA:  GETA. COMPLICATIONS:  0.    SPECIMENS REMOVED:  0.    IMPLANTS:  Endo graft and anchors. ESTIMATED BLOOD LOSS:  50. INDICATIONS FOR PROCEDURE:  The patient is an elderly female with an enlarging aortic aneurysm. Decision was made to take her to the operating room for elective repair. PROCEDURE IN DETAILS:  The patient was taken to operating room. Once suitable level of anesthesia was induced, she was prepped and draped in typical sterile fashion. Oblique incision was made in both groins and dissection carried out down the common femoral artery which was dissected free of its soft tissue attachments. The patient was systemically heparinized and sheaths placed into both groins. Catheter was introduced into the proximal aorta and an aortogram performed. This localized the left and right renal arteries. Next a 25 mm modular aortic endograft was deployed just inferior to the renal arteries. Main body went up the left side with crossing limbs. Next, the gate was then cannulated and the docking prosthesis placed and the main body docking prosthesis was subsequently placed in the usual fashion. Two 7 mm balloons were then used to post dilate the aortic bifurcation and the Reliant balloon used to remodel the graft throughout.   Next, the Aptus device was extended proximally and five Aptus screws were placed at the proximal aortic neck, done in the usual fashion. Completion angiography showed a small delayed type 2 endoleak, but no evidence of significant complication. Devices were removed, arteries repaired with 5-0 Prolene suture. Wounds irrigated thoroughly and closed in multiple layers. She tolerated the procedure well. Sponge and instrument counts correct x2. She was awakened and transferred to recovery room in good condition.         Magnolia Kenney MD      [id number]  D:  03/04/2023 11:39  T:  03/04/2023 22:21  JOB #:  [id number]

## 2023-03-06 ENCOUNTER — APPOINTMENT (OUTPATIENT)
Dept: GENERAL RADIOLOGY | Age: 88
End: 2023-03-06
Attending: EMERGENCY MEDICINE
Payer: MEDICARE

## 2023-03-06 ENCOUNTER — HOSPITAL ENCOUNTER (INPATIENT)
Age: 88
LOS: 4 days | Discharge: HOME | End: 2023-03-10
Attending: EMERGENCY MEDICINE | Admitting: INTERNAL MEDICINE
Payer: MEDICARE

## 2023-03-06 DIAGNOSIS — E86.0 DEHYDRATION: ICD-10-CM

## 2023-03-06 DIAGNOSIS — R77.8 ELEVATED TROPONIN: ICD-10-CM

## 2023-03-06 DIAGNOSIS — I48.91 ATRIAL FIBRILLATION WITH RAPID VENTRICULAR RESPONSE (HCC): Primary | ICD-10-CM

## 2023-03-06 LAB
ALBUMIN SERPL-MCNC: 2.2 G/DL (ref 3.5–5)
ALBUMIN/GLOB SERPL: 0.5 (ref 1.1–2.2)
ALP SERPL-CCNC: 77 U/L (ref 45–117)
ALT SERPL-CCNC: 20 U/L (ref 12–78)
ANION GAP SERPL CALC-SCNC: 7 MMOL/L (ref 5–15)
APPEARANCE UR: ABNORMAL
AST SERPL-CCNC: 24 U/L (ref 15–37)
BACTERIA URNS QL MICRO: ABNORMAL /HPF
BASOPHILS # BLD: 0 K/UL (ref 0–0.1)
BASOPHILS NFR BLD: 0 % (ref 0–1)
BILIRUB SERPL-MCNC: 0.6 MG/DL (ref 0.2–1)
BILIRUB UR QL: NEGATIVE
BUN SERPL-MCNC: 51 MG/DL (ref 6–20)
BUN/CREAT SERPL: 27 (ref 12–20)
CALCIUM SERPL-MCNC: 8.8 MG/DL (ref 8.5–10.1)
CHLORIDE SERPL-SCNC: 100 MMOL/L (ref 97–108)
CO2 SERPL-SCNC: 26 MMOL/L (ref 21–32)
COLOR UR: ABNORMAL
COMMENT, HOLDF: NORMAL
CREAT SERPL-MCNC: 1.89 MG/DL (ref 0.55–1.02)
DIFFERENTIAL METHOD BLD: ABNORMAL
EOSINOPHIL # BLD: 0 K/UL (ref 0–0.4)
EOSINOPHIL NFR BLD: 0 % (ref 0–7)
EPITH CASTS URNS QL MICRO: ABNORMAL /LPF
ERYTHROCYTE [DISTWIDTH] IN BLOOD BY AUTOMATED COUNT: 13.2 % (ref 11.5–14.5)
GLOBULIN SER CALC-MCNC: 4.2 G/DL (ref 2–4)
GLUCOSE BLD-MCNC: 104 MG/DL (ref 65–100)
GLUCOSE SERPL-MCNC: 114 MG/DL (ref 65–100)
GLUCOSE UR STRIP.AUTO-MCNC: NEGATIVE MG/DL
HCT VFR BLD AUTO: 33 % (ref 35–47)
HGB BLD-MCNC: 10.8 G/DL (ref 11.5–16)
HGB UR QL STRIP: ABNORMAL
IMM GRANULOCYTES # BLD AUTO: 0 K/UL
IMM GRANULOCYTES NFR BLD AUTO: 0 %
KETONES UR QL STRIP.AUTO: NEGATIVE MG/DL
LEUKOCYTE ESTERASE UR QL STRIP.AUTO: ABNORMAL
LYMPHOCYTES # BLD: 1.1 K/UL (ref 0.8–3.5)
LYMPHOCYTES NFR BLD: 12 % (ref 12–49)
MAGNESIUM SERPL-MCNC: 1.4 MG/DL (ref 1.6–2.4)
MCH RBC QN AUTO: 32.8 PG (ref 26–34)
MCHC RBC AUTO-ENTMCNC: 32.7 G/DL (ref 30–36.5)
MCV RBC AUTO: 100.3 FL (ref 80–99)
MONOCYTES # BLD: 1 K/UL (ref 0–1)
MONOCYTES NFR BLD: 11 % (ref 5–13)
NEUTS BAND NFR BLD MANUAL: 7 % (ref 0–6)
NEUTS SEG # BLD: 7.1 K/UL (ref 1.8–8)
NEUTS SEG NFR BLD: 70 % (ref 32–75)
NITRITE UR QL STRIP.AUTO: POSITIVE
NRBC # BLD: 0 K/UL (ref 0–0.01)
NRBC BLD-RTO: 0 PER 100 WBC
OTHER,OTHU: ABNORMAL
PH UR STRIP: 5 (ref 5–8)
PLATELET # BLD AUTO: 213 K/UL (ref 150–400)
PMV BLD AUTO: 10.3 FL (ref 8.9–12.9)
POTASSIUM SERPL-SCNC: 3.8 MMOL/L (ref 3.5–5.1)
PROT SERPL-MCNC: 6.4 G/DL (ref 6.4–8.2)
PROT UR STRIP-MCNC: 30 MG/DL
RBC # BLD AUTO: 3.29 M/UL (ref 3.8–5.2)
RBC #/AREA URNS HPF: ABNORMAL /HPF (ref 0–5)
RBC MORPH BLD: ABNORMAL
SAMPLES BEING HELD,HOLD: NORMAL
SERVICE CMNT-IMP: ABNORMAL
SODIUM SERPL-SCNC: 133 MMOL/L (ref 136–145)
SP GR UR REFRACTOMETRY: 1.01 (ref 1–1.03)
TROPONIN I SERPL HS-MCNC: 269 NG/L (ref 0–51)
TROPONIN I SERPL HS-MCNC: 345 NG/L (ref 0–51)
TSH SERPL DL<=0.05 MIU/L-ACNC: 2.97 UIU/ML (ref 0.36–3.74)
UA: UC IF INDICATED,UAUC: ABNORMAL
UROBILINOGEN UR QL STRIP.AUTO: 0.2 EU/DL (ref 0.2–1)
WBC # BLD AUTO: 9.2 K/UL (ref 3.6–11)
WBC URNS QL MICRO: ABNORMAL /HPF (ref 0–4)

## 2023-03-06 PROCEDURE — 81001 URINALYSIS AUTO W/SCOPE: CPT

## 2023-03-06 PROCEDURE — 36415 COLL VENOUS BLD VENIPUNCTURE: CPT

## 2023-03-06 PROCEDURE — 84484 ASSAY OF TROPONIN QUANT: CPT

## 2023-03-06 PROCEDURE — 82962 GLUCOSE BLOOD TEST: CPT

## 2023-03-06 PROCEDURE — 99285 EMERGENCY DEPT VISIT HI MDM: CPT

## 2023-03-06 PROCEDURE — 85025 COMPLETE CBC W/AUTO DIFF WBC: CPT

## 2023-03-06 PROCEDURE — 74011000250 HC RX REV CODE- 250: Performed by: INTERNAL MEDICINE

## 2023-03-06 PROCEDURE — 84443 ASSAY THYROID STIM HORMONE: CPT

## 2023-03-06 PROCEDURE — 96361 HYDRATE IV INFUSION ADD-ON: CPT

## 2023-03-06 PROCEDURE — 74011250636 HC RX REV CODE- 250/636: Performed by: INTERNAL MEDICINE

## 2023-03-06 PROCEDURE — 74011250637 HC RX REV CODE- 250/637: Performed by: INTERNAL MEDICINE

## 2023-03-06 PROCEDURE — 83735 ASSAY OF MAGNESIUM: CPT

## 2023-03-06 PROCEDURE — 71045 X-RAY EXAM CHEST 1 VIEW: CPT

## 2023-03-06 PROCEDURE — 80053 COMPREHEN METABOLIC PANEL: CPT

## 2023-03-06 PROCEDURE — 87077 CULTURE AEROBIC IDENTIFY: CPT

## 2023-03-06 PROCEDURE — 96360 HYDRATION IV INFUSION INIT: CPT

## 2023-03-06 PROCEDURE — 93005 ELECTROCARDIOGRAM TRACING: CPT

## 2023-03-06 PROCEDURE — 74011250636 HC RX REV CODE- 250/636: Performed by: EMERGENCY MEDICINE

## 2023-03-06 PROCEDURE — 87186 SC STD MICRODIL/AGAR DIL: CPT

## 2023-03-06 PROCEDURE — 87086 URINE CULTURE/COLONY COUNT: CPT

## 2023-03-06 PROCEDURE — 87040 BLOOD CULTURE FOR BACTERIA: CPT

## 2023-03-06 PROCEDURE — 65270000029 HC RM PRIVATE

## 2023-03-06 RX ORDER — DILTIAZEM HYDROCHLORIDE 5 MG/ML
10 INJECTION INTRAVENOUS
Status: COMPLETED | OUTPATIENT
Start: 2023-03-06 | End: 2023-03-06

## 2023-03-06 RX ORDER — SODIUM CHLORIDE 0.9 % (FLUSH) 0.9 %
5-40 SYRINGE (ML) INJECTION AS NEEDED
Status: DISCONTINUED | OUTPATIENT
Start: 2023-03-06 | End: 2023-03-10 | Stop reason: HOSPADM

## 2023-03-06 RX ORDER — POLYETHYLENE GLYCOL 3350 17 G/17G
17 POWDER, FOR SOLUTION ORAL DAILY PRN
Status: DISCONTINUED | OUTPATIENT
Start: 2023-03-06 | End: 2023-03-10 | Stop reason: HOSPADM

## 2023-03-06 RX ORDER — ARFORMOTEROL TARTRATE 15 UG/2ML
15 SOLUTION RESPIRATORY (INHALATION)
Status: DISCONTINUED | OUTPATIENT
Start: 2023-03-06 | End: 2023-03-07

## 2023-03-06 RX ORDER — HYDROCHLOROTHIAZIDE 25 MG/1
25 TABLET ORAL EVERY MORNING
Status: DISCONTINUED | OUTPATIENT
Start: 2023-03-07 | End: 2023-03-10 | Stop reason: HOSPADM

## 2023-03-06 RX ORDER — AMLODIPINE BESYLATE 5 MG/1
5 TABLET ORAL EVERY MORNING
Status: DISCONTINUED | OUTPATIENT
Start: 2023-03-07 | End: 2023-03-10 | Stop reason: HOSPADM

## 2023-03-06 RX ORDER — ACETAMINOPHEN 325 MG/1
650 TABLET ORAL
Status: DISCONTINUED | OUTPATIENT
Start: 2023-03-06 | End: 2023-03-10 | Stop reason: HOSPADM

## 2023-03-06 RX ORDER — SODIUM CHLORIDE 0.9 % (FLUSH) 0.9 %
5-40 SYRINGE (ML) INJECTION EVERY 8 HOURS
Status: DISCONTINUED | OUTPATIENT
Start: 2023-03-06 | End: 2023-03-10 | Stop reason: HOSPADM

## 2023-03-06 RX ORDER — PRAVASTATIN SODIUM 20 MG/1
40 TABLET ORAL
Status: DISCONTINUED | OUTPATIENT
Start: 2023-03-06 | End: 2023-03-10 | Stop reason: HOSPADM

## 2023-03-06 RX ORDER — GUAIFENESIN 100 MG/5ML
81 LIQUID (ML) ORAL DAILY
Status: DISCONTINUED | OUTPATIENT
Start: 2023-03-07 | End: 2023-03-08

## 2023-03-06 RX ORDER — MAGNESIUM SULFATE HEPTAHYDRATE 40 MG/ML
2 INJECTION, SOLUTION INTRAVENOUS ONCE
Status: COMPLETED | OUTPATIENT
Start: 2023-03-06 | End: 2023-03-07

## 2023-03-06 RX ORDER — ONDANSETRON 4 MG/1
4 TABLET, ORALLY DISINTEGRATING ORAL
Status: DISCONTINUED | OUTPATIENT
Start: 2023-03-06 | End: 2023-03-10 | Stop reason: HOSPADM

## 2023-03-06 RX ORDER — SODIUM CHLORIDE 0.9 % (FLUSH) 0.9 %
5-10 SYRINGE (ML) INJECTION AS NEEDED
Status: DISCONTINUED | OUTPATIENT
Start: 2023-03-06 | End: 2023-03-10 | Stop reason: HOSPADM

## 2023-03-06 RX ORDER — ACETAMINOPHEN 650 MG/1
650 SUPPOSITORY RECTAL
Status: DISCONTINUED | OUTPATIENT
Start: 2023-03-06 | End: 2023-03-10 | Stop reason: HOSPADM

## 2023-03-06 RX ORDER — LISINOPRIL 20 MG/1
20 TABLET ORAL DAILY
Status: DISCONTINUED | OUTPATIENT
Start: 2023-03-07 | End: 2023-03-10 | Stop reason: HOSPADM

## 2023-03-06 RX ORDER — METOPROLOL SUCCINATE 25 MG/1
25 TABLET, EXTENDED RELEASE ORAL DAILY
Status: DISCONTINUED | OUTPATIENT
Start: 2023-03-06 | End: 2023-03-10

## 2023-03-06 RX ORDER — ONDANSETRON 2 MG/ML
4 INJECTION INTRAMUSCULAR; INTRAVENOUS
Status: DISCONTINUED | OUTPATIENT
Start: 2023-03-06 | End: 2023-03-10 | Stop reason: HOSPADM

## 2023-03-06 RX ORDER — IPRATROPIUM BROMIDE 0.5 MG/2.5ML
0.5 SOLUTION RESPIRATORY (INHALATION)
Status: DISCONTINUED | OUTPATIENT
Start: 2023-03-06 | End: 2023-03-07

## 2023-03-06 RX ADMIN — APIXABAN 2.5 MG: 2.5 TABLET, FILM COATED ORAL at 20:20

## 2023-03-06 RX ADMIN — METOPROLOL SUCCINATE 25 MG: 25 TABLET, EXTENDED RELEASE ORAL at 20:20

## 2023-03-06 RX ADMIN — PRAVASTATIN SODIUM 40 MG: 20 TABLET ORAL at 21:17

## 2023-03-06 RX ADMIN — DILTIAZEM HYDROCHLORIDE 10 MG: 5 INJECTION INTRAVENOUS at 20:20

## 2023-03-06 RX ADMIN — SODIUM CHLORIDE 1000 ML: 9 INJECTION, SOLUTION INTRAVENOUS at 18:32

## 2023-03-06 RX ADMIN — SODIUM CHLORIDE 1000 ML: 9 INJECTION, SOLUTION INTRAVENOUS at 18:31

## 2023-03-06 RX ADMIN — MAGNESIUM SULFATE HEPTAHYDRATE 2 G: 40 INJECTION, SOLUTION INTRAVENOUS at 22:50

## 2023-03-06 RX ADMIN — DILTIAZEM HYDROCHLORIDE 10 MG: 5 INJECTION INTRAVENOUS at 21:17

## 2023-03-06 NOTE — ED PROVIDER NOTES
The history is provided by the patient. Diarrhea   This is a new problem. The current episode started 2 days ago. The problem occurs constantly. The problem has been resolved. The pain is associated with an unknown factor. The patient is experiencing no pain. Associated symptoms include diarrhea. Pertinent negatives include no fever, no nausea, no vomiting, no dysuria and no frequency. Nothing worsens the pain. The pain is relieved by Nothing. Her past medical history does not include UTI.   recent AAA repair     Past Medical History:   Diagnosis Date    Abdominal aneurysm     Arthritis     Chronic obstructive pulmonary disease ([facility] [address]     Hyperlipidemia     Hypertension        Past Surgical History:   Procedure Laterality Date    COLONOSCOPY N/A 2017    COLONOSCOPY, EGD performed by Toribio Daniels MD at [address]    HX HEENT Bilateral 2023    cateract surgery    HX ORTHOPAEDIC Left     ORIF left wrist         Family History:   Problem Relation Age of Onset    Cancer Brother        Social History     Socioeconomic History    Marital status:      Spouse name: Not on file    Number of children: Not on file    Years of education: Not on file    Highest education level: Not on file   Occupational History    Not on file   Tobacco Use    Smoking status: Former     Packs/day: 1.00     Years: 35.00     Pack years: 35.00     Types: Cigarettes     Quit date: 2015     Years since quittin.2    Smokeless tobacco: Never   Vaping Use    Vaping Use: Never used   Substance and Sexual Activity    Alcohol use: No    Drug use: No    Sexual activity: Not on file   Other Topics Concern    Not on file   Social History Narrative    Not on file     Social Determinants of Health     Financial Resource Strain: Not on file   Food Insecurity: Not on file   Transportation Needs: Not on file   Physical Activity: Not on file   Stress: Not on file   Social Connections: Not on file   Intimate Partner Violence: Not on file   Housing Stability: Not on file         ALLERGIES: Tetracycline    Review of Systems   Constitutional:  Negative for fever. Gastrointestinal:  Positive for diarrhea. Negative for abdominal pain, anal bleeding, blood in stool, nausea and vomiting. Genitourinary:  Positive for decreased urine volume. Negative for dysuria and frequency. All other systems reviewed and are negative. There were no vitals filed for this visit. Physical Exam  Vitals and nursing note reviewed. Constitutional:       Appearance: She is well-developed. She is not ill-appearing. HENT:      Head: Normocephalic and atraumatic. Eyes:      General: No scleral icterus. Cardiovascular:      Rate and Rhythm: Normal rate. Pulmonary:      Effort: Pulmonary effort is normal.   Abdominal:      General: There is no distension. Tenderness: There is no abdominal tenderness. Musculoskeletal:      Cervical back: Normal range of motion. Skin:     General: Skin is warm and dry. Findings: No erythema or rash. Neurological:      General: No focal deficit present. Mental Status: She is alert and oriented to person, place, and time. Psychiatric:         Mood and Affect: Mood normal.         Behavior: Behavior normal.        Medical Decision Making  Amount and/or Complexity of Data Reviewed  Labs: ordered. Decision-making details documented in ED Course. Radiology: ordered. ECG/medicine tests: ordered. Risk  Prescription drug management. Decision regarding hospitalization. ED Course as of 03/06/23 1903   Mon Mar 06, 2023   1829 EKG at 6:27 PM shows atrial fibrillation, 117 bpm, PVCs versus aberrantly conducted complexes, no STEMI [IO]   1901 Troponin-High Sensitivity(!!): 345  I have a low suspicion for ACS - this elevation may be rate related. We will continue to trend.  [IO]      ED Course User Index  [IO] Kelly Rubio MD       Procedures    DDX: arrhythmia, STEMI, sepsis, UTI, electrolyte abnormality, dehydration, diverticulitis, ruptured AAA    Ema Velasco MD has spent 35 minutes of critical care time involved in lab review, consultations with specialist, family decision-making, and documentation. During this entire length of time I was immediately available to the patient. Critical Care: The reason for providing this level of medical care for this critically ill patient was due a critical illness that impaired one or more vital organ systems such that there was a high probability of imminent or life threatening deterioration in the patients condition. This care involved high complexity decision making to assess, manipulate, and support vital system functions, to treat this degreee vital organ system failure and to prevent further life threatening deterioration of the patients condition. Perfect Serve Consult for Admission  7:04 PM    ED Room Number: ER09/09  Patient Name and age:  Eli Moraes 82 y.o.  female  Working Diagnosis:   1. Atrial fibrillation with rapid ventricular response (HCC)    2. Elevated troponin    3.  Dehydration      COVID-19 Suspicion:  no  Sepsis present:  no  Reassessment needed: no  Code Status:  Full Code  Readmission: yes  Isolation Requirements:  no  Recommended Level of Care:  telemetry  Department: [facility] - [phone]  Admitting Provider: Dr. Morteza Tobias  Other:  new onset Afib, rate controlled

## 2023-03-06 NOTE — ED TRIAGE NOTES
Pt arrives for concern for dehydration after having several days of diarrhea over the weekend  Pt had aortic aneurism surgery on Thursday

## 2023-03-07 ENCOUNTER — APPOINTMENT (OUTPATIENT)
Dept: ULTRASOUND IMAGING | Age: 88
End: 2023-03-07
Attending: INTERNAL MEDICINE
Payer: MEDICARE

## 2023-03-07 LAB
ANION GAP SERPL CALC-SCNC: 7 MMOL/L (ref 5–15)
ATRIAL RATE: 108 BPM
BUN SERPL-MCNC: 45 MG/DL (ref 6–20)
BUN/CREAT SERPL: 38 (ref 12–20)
CALCIUM SERPL-MCNC: 8.4 MG/DL (ref 8.5–10.1)
CALCULATED R AXIS, ECG10: 3 DEGREES
CALCULATED T AXIS, ECG11: -13 DEGREES
CHLORIDE SERPL-SCNC: 105 MMOL/L (ref 97–108)
CO2 SERPL-SCNC: 23 MMOL/L (ref 21–32)
CREAT SERPL-MCNC: 1.2 MG/DL (ref 0.55–1.02)
DIAGNOSIS, 93000: NORMAL
GLUCOSE SERPL-MCNC: 113 MG/DL (ref 65–100)
POTASSIUM SERPL-SCNC: 3.5 MMOL/L (ref 3.5–5.1)
Q-T INTERVAL, ECG07: 290 MS
QRS DURATION, ECG06: 70 MS
QTC CALCULATION (BEZET), ECG08: 404 MS
SODIUM SERPL-SCNC: 135 MMOL/L (ref 136–145)
VENTRICULAR RATE, ECG03: 117 BPM

## 2023-03-07 PROCEDURE — 74011000250 HC RX REV CODE- 250: Performed by: INTERNAL MEDICINE

## 2023-03-07 PROCEDURE — 76770 US EXAM ABDO BACK WALL COMP: CPT

## 2023-03-07 PROCEDURE — 84300 ASSAY OF URINE SODIUM: CPT

## 2023-03-07 PROCEDURE — 74011250636 HC RX REV CODE- 250/636: Performed by: INTERNAL MEDICINE

## 2023-03-07 PROCEDURE — 74011000258 HC RX REV CODE- 258: Performed by: EMERGENCY MEDICINE

## 2023-03-07 PROCEDURE — 94761 N-INVAS EAR/PLS OXIMETRY MLT: CPT

## 2023-03-07 PROCEDURE — 74011250637 HC RX REV CODE- 250/637: Performed by: INTERNAL MEDICINE

## 2023-03-07 PROCEDURE — 84540 ASSAY OF URINE/UREA-N: CPT

## 2023-03-07 PROCEDURE — 36415 COLL VENOUS BLD VENIPUNCTURE: CPT

## 2023-03-07 PROCEDURE — 65270000029 HC RM PRIVATE

## 2023-03-07 PROCEDURE — 99223 1ST HOSP IP/OBS HIGH 75: CPT | Performed by: INTERNAL MEDICINE

## 2023-03-07 PROCEDURE — 80048 BASIC METABOLIC PNL TOTAL CA: CPT

## 2023-03-07 PROCEDURE — APPSS30 APP SPLIT SHARED TIME 16-30 MINUTES: Performed by: NURSE PRACTITIONER

## 2023-03-07 RX ORDER — MAGNESIUM SULFATE HEPTAHYDRATE 40 MG/ML
2 INJECTION, SOLUTION INTRAVENOUS ONCE
Status: COMPLETED | OUTPATIENT
Start: 2023-03-07 | End: 2023-03-07

## 2023-03-07 RX ORDER — SODIUM CHLORIDE 9 MG/ML
75 INJECTION, SOLUTION INTRAVENOUS CONTINUOUS
Status: DISCONTINUED | OUTPATIENT
Start: 2023-03-07 | End: 2023-03-08

## 2023-03-07 RX ORDER — IPRATROPIUM BROMIDE AND ALBUTEROL SULFATE 2.5; .5 MG/3ML; MG/3ML
3 SOLUTION RESPIRATORY (INHALATION)
Status: DISCONTINUED | OUTPATIENT
Start: 2023-03-07 | End: 2023-03-10 | Stop reason: HOSPADM

## 2023-03-07 RX ADMIN — SODIUM CHLORIDE, PRESERVATIVE FREE 10 ML: 5 INJECTION INTRAVENOUS at 14:30

## 2023-03-07 RX ADMIN — PRAVASTATIN SODIUM 40 MG: 20 TABLET ORAL at 21:20

## 2023-03-07 RX ADMIN — ACETAMINOPHEN 650 MG: 325 TABLET ORAL at 00:48

## 2023-03-07 RX ADMIN — SODIUM CHLORIDE, PRESERVATIVE FREE 10 ML: 5 INJECTION INTRAVENOUS at 21:21

## 2023-03-07 RX ADMIN — ACETAMINOPHEN 650 MG: 325 TABLET ORAL at 21:20

## 2023-03-07 RX ADMIN — SODIUM CHLORIDE 184 ML: 9 INJECTION, SOLUTION INTRAVENOUS at 06:53

## 2023-03-07 RX ADMIN — SODIUM CHLORIDE 75 ML/HR: 9 INJECTION, SOLUTION INTRAMUSCULAR; INTRAVENOUS; SUBCUTANEOUS at 08:01

## 2023-03-07 RX ADMIN — APIXABAN 2.5 MG: 2.5 TABLET, FILM COATED ORAL at 08:14

## 2023-03-07 RX ADMIN — ASPIRIN 81 MG: 81 TABLET, CHEWABLE ORAL at 08:14

## 2023-03-07 RX ADMIN — MAGNESIUM SULFATE HEPTAHYDRATE 2 G: 40 INJECTION, SOLUTION INTRAVENOUS at 08:01

## 2023-03-07 RX ADMIN — SODIUM CHLORIDE 100 ML/HR: 9 INJECTION, SOLUTION INTRAMUSCULAR; INTRAVENOUS; SUBCUTANEOUS at 21:22

## 2023-03-07 RX ADMIN — ACETAMINOPHEN 650 MG: 325 TABLET ORAL at 06:52

## 2023-03-07 RX ADMIN — ACETAMINOPHEN 650 MG: 325 TABLET ORAL at 13:02

## 2023-03-07 RX ADMIN — APIXABAN 2.5 MG: 2.5 TABLET, FILM COATED ORAL at 17:17

## 2023-03-07 RX ADMIN — CEFTRIAXONE 1 G: 1 INJECTION, POWDER, FOR SOLUTION INTRAMUSCULAR; INTRAVENOUS at 08:02

## 2023-03-07 NOTE — H&P
Hospitalist Admission Note    NAME:  Jayro Powell   :  1935   MRN:  [id number]     Date/Time:  3/6/2023 7:57 PM    Patient PCP: Mary Castillo MD  ________________________________________________________________________    Given the patient's current clinical presentation, I have a high level of concern for decompensation if discharged from the emergency department. Complex decision making was performed, which includes reviewing the patient's available past medical records, laboratory results, and x-ray films. My assessment of this patient's clinical condition and my plan of care is as follows. Assessment / Plan:    New onset AFIb w/ RVR:    The patient comes in and found to be in new onset AFIB who is relatively asymptomatic    VS -   WBC normal, Hg 10.8  BUN 51, Cr 1.89  hsTrop 345  CXR - clear  EKG w/ AFIB    Admit and cont to monitor on telemetry  ER has tx w/ 2L IVF  Recommend prn Diltaizem and re-evaluate HR - If we cannot breakthrough she will need escalated tx w/ drip  Start Toprol XL and Eliquis  Obtain TSH, Mg and Echo  Trend hsTrop  Consult Cardiology    Update 3/7 06  Patient was given 2 doses of Diltiazem 10mg which did bring her HR to goal < 110. Currently she is at  and doing well. 2.  HTN:    Cont w/ Norvasc 5mg daily, Benazepril 20mg daily, HCTZ 25mg daily    3. HLD:    Cont w/ Pravastatin 40mg daily    There is no height or weight on file to calculate BMI.:  30.0 - 39.9:  Obese    I have personally reviewed the radiographs, laboratory data in Epic and decisions and statements above are based partially on this personal interpretation.     Code Status: Full Code  Surrogate Decision Maker  Yaya Yepez (son)  [phone]    Prophylaxis:  Eliquis     Subjective:   CHIEF COMPLAINT: My blood pressure is low    HISTORY OF PRESENT ILLNESS:       The patient is a 81 y/o C F w/ recent PMH endovascular abdominal aortic aneurysm on 3/2/23 and comes into the ER today as she noted her blood pressure was low at home. She denies any chest pain, palpitations, coughing, wheezing or dyspnea. She has no fever, chills or night sweats. She has no recent weight gain or lower extremity edema. She has no dizziness or syncopal episodes. She has no abdominal pain, nausea, vomiting or diarrhea. Her endovascular sites look clean w/o any swelling, erythema, warmth or pain. Past Medical History:   Diagnosis Date    Abdominal aneurysm     Arthritis     Chronic obstructive pulmonary disease ([facility] [address]     Hyperlipidemia     Hypertension       Past Surgical History:   Procedure Laterality Date    COLONOSCOPY N/A 2017    COLONOSCOPY, EGD performed by Magalys Alva MD at [address] HEENT Bilateral 2023    cateract surgery    HX ORTHOPAEDIC Left     ORIF left wrist     Social History     Tobacco Use    Smoking status: Former     Packs/day: 1.00     Years: 35.00     Pack years: 35.00     Types: Cigarettes     Quit date: 2015     Years since quittin.2    Smokeless tobacco: Never   Substance Use Topics    Alcohol use: No      Family History   Problem Relation Age of Onset    Cancer Brother         Allergies   Allergen Reactions    Tetracycline Other (comments)     Mouth soreness        Prior to Admission medications    Medication Sig Start Date End Date Taking? Authorizing Provider   HYDROcodone-acetaminophen (NORCO) 5-325 mg per tablet Take 1 Tablet by mouth every four (4) hours as needed for Pain for up to 3 days. Max Daily Amount: 6 Tablets. 3/3/23 3/6/23  Shandra Rm MD   loratadine (CLARITIN) 10 mg tablet Take 10 mg by mouth Every morning. Provider, Historical   cholecalciferol, vitamin d3, 10 mcg (400 unit) cap Take 1 Tablet by mouth Every morning. Provider, Historical   tiotropium-olodateroL (Stiolto Respimat) 2.5-2.5 mcg/actuation inhaler Take 2 Puffs by inhalation Every morning.     Provider, Historical   OTHER Administer 1 Drop to both eyes Every morning. Prednicort eye drops    Provider, Historical   aspirin 81 mg chewable tablet Take 1 tablet by mouth daily. 9/9/14   Dennis Duron MD   amLODIPine (NORVASC) 5 mg tablet Take 5 mg by mouth Every morning. Provider, Historical   benazepril (LOTENSIN) 20 mg tablet Take 20 mg by mouth Every morning. Provider, Historical   fish oil-omega-3 fatty acids 340-1,000 mg capsule Take 2 Capsules by mouth daily. Provider, Historical   pravastatin (PRAVACHOL) 40 mg tablet Take 40 mg by mouth nightly. Vanessa Louise MD   hydrochlorothiazide (HYDRODIURIL) 25 mg tablet Take 25 mg by mouth Every morning.     Vanessa Louise MD       REVIEW OF SYSTEMS:  See HPI for details  General: negative for fever, chills, sweats, weakness, weight loss  Eyes: negative for blurred vision, eye pain, loss of vision, diplopia  Ear Nose and Throat: negative for rhinorrhea, pharyngitis, otalgia, tinnitus, speech or swallowing difficulties  Respiratory:  negative for pleuritic pain, cough, sputum production, wheezing, SOB, CAMPO  Cardiology:  negative for chest pain, palpitations, orthopnea, PND, edema, syncope   Gastrointestinal: negative for abdominal pain, N/V, dysphagia, change in bowel habits, bleeding  Genitourinary: negative for frequency, urgency, dysuria, hematuria, incontinence  Muskuloskeletal : negative for arthralgia, myalgia  Hematology: negative for easy bruising, bleeding, lymphadenopathy  Dermatological: negative for rash, ulceration, mole change, new lesion  Endocrine: negative for hot flashes or polydipsia  Neurological: negative for headache, dizziness, confusion, focal weakness, paresthesia, memory loss, gait disturbance  Psychological: negative for anxiety, depression, agitation    Objective:   VITALS:    Visit Vitals  /64   Pulse 85   Temp 97.9 °F (36.6 °C)   Resp 20   SpO2 100%     PHYSICAL EXAM:    Physical Exam:    Gen: Well-developed, well-nourished, in no acute distress  HEENT:  Pink conjunctivae, PERRL, hearing intact to voice, moist mucous membranes  Neck: Supple, without masses, thyroid non-tender  Resp: No accessory muscle use, clear breath sounds without wheezes rales or rhonchi  Card: +Tachycardia, No murmurs, normal S1, S2 without thrills, bruits or peripheral edema  Abd:  Soft, non-tender, non-distended, normoactive bowel sounds are present, no palpable organomegaly and no detectable hernias  Lymph:  No cervical or inguinal adenopathy  Musc: No cyanosis or clubbing  Skin: No rashes or ulcers, skin turgor is good  Neuro:  Cranial nerves are grossly intact, no focal motor weakness, follows commands appropriately  Psych:  Good insight, oriented to person, place and time, alert  _______________________________________________________________________  Care Plan discussed with:  Pt's condition, Imaging findings, Lab findings, Assessment, and Care Plan discussed with: Patient  _______________________________________________________________________    Probable disposition:  Home  ________________________________________________________________________    Comments   >50% of visit spent in counseling and coordination of care  Chart reviewed  Discussion with patient and/or family and questions answered     ________________________________________________________________________  Signed: Dianne Immanuel, MD        Procedures: see electronic medical records for all procedures/Xrays and details which were not copied into this note but were reviewed prior to creation of Plan.     LAB DATA REVIEWED:    Recent Results (from the past 24 hour(s))   CBC WITH AUTOMATED DIFF    Collection Time: 03/06/23  6:10 PM   Result Value Ref Range    WBC 9.2 3.6 - 11.0 K/uL    RBC 3.29 (L) 3.80 - 5.20 M/uL    HGB 10.8 (L) 11.5 - 16.0 g/dL    HCT 33.0 (L) 35.0 - 47.0 %    .3 (H) 80.0 - 99.0 FL    MCH 32.8 26.0 - 34.0 PG    MCHC 32.7 30.0 - 36.5 g/dL    RDW 13.2 11.5 - 14.5 %    PLATELET 548 695 - 260 K/uL    MPV 10.3 8.9 - 12.9 FL    NRBC 0.0 0  WBC    ABSOLUTE NRBC 0.00 0.00 - 0.01 K/uL    NEUTROPHILS 70 32 - 75 %    BAND NEUTROPHILS 7 (H) 0 - 6 %    LYMPHOCYTES 12 12 - 49 %    MONOCYTES 11 5 - 13 %    EOSINOPHILS 0 0 - 7 %    BASOPHILS 0 0 - 1 %    IMMATURE GRANULOCYTES 0 %    ABS. NEUTROPHILS 7.1 1.8 - 8.0 K/UL    ABS. LYMPHOCYTES 1.1 0.8 - 3.5 K/UL    ABS. MONOCYTES 1.0 0.0 - 1.0 K/UL    ABS. EOSINOPHILS 0.0 0.0 - 0.4 K/UL    ABS. BASOPHILS 0.0 0.0 - 0.1 K/UL    ABS. IMM. GRANS. 0.0 K/UL    DF MANUAL      RBC COMMENTS NORMOCYTIC, NORMOCHROMIC     METABOLIC PANEL, COMPREHENSIVE    Collection Time: 03/06/23  6:10 PM   Result Value Ref Range    Sodium 133 (L) 136 - 145 mmol/L    Potassium 3.8 3.5 - 5.1 mmol/L    Chloride 100 97 - 108 mmol/L    CO2 26 21 - 32 mmol/L    Anion gap 7 5 - 15 mmol/L    Glucose 114 (H) 65 - 100 mg/dL    BUN 51 (H) 6 - 20 MG/DL    Creatinine 1.89 (H) 0.55 - 1.02 MG/DL    BUN/Creatinine ratio 27 (H) 12 - 20      eGFR 26 (L) >60 ml/min/1.73m2    Calcium 8.8 8.5 - 10.1 MG/DL    Bilirubin, total 0.6 0.2 - 1.0 MG/DL    ALT (SGPT) 20 12 - 78 U/L    AST (SGOT) 24 15 - 37 U/L    Alk. phosphatase 77 45 - 117 U/L    Protein, total 6.4 6.4 - 8.2 g/dL    Albumin 2.2 (L) 3.5 - 5.0 g/dL    Globulin 4.2 (H) 2.0 - 4.0 g/dL    A-G Ratio 0.5 (L) 1.1 - 2.2     SAMPLES BEING HELD    Collection Time: 03/06/23  6:10 PM   Result Value Ref Range    SAMPLES BEING HELD  1SST, 1BLU     COMMENT        Add-on orders for these samples will be processed based on acceptable specimen integrity and analyte stability, which may vary by analyte.    TROPONIN-HIGH SENSITIVITY    Collection Time: 03/06/23  6:11 PM   Result Value Ref Range    Troponin-High Sensitivity 345 (HH) 0 - 51 ng/L   EKG, 12 LEAD, INITIAL    Collection Time: 03/06/23  6:27 PM   Result Value Ref Range    Ventricular Rate 117 BPM    Atrial Rate 108 BPM    QRS Duration 70 ms    Q-T Interval 290 ms    QTC Calculation (Bezet) 404 ms    Calculated R Axis 3 degrees    Calculated T Axis -13 degrees    Diagnosis       Atrial fibrillation with rapid ventricular response with premature   ventricular or aberrantly conducted complexes  Nonspecific ST abnormality  Abnormal ECG  When compared with ECG of 22-FEB-2023 15:48,  Atrial fibrillation has replaced Sinus rhythm     GLUCOSE, POC    Collection Time: 03/06/23  6:57 PM   Result Value Ref Range    Glucose (POC) 104 (H) 65 - 100 mg/dL    Performed by Khris Brantley

## 2023-03-07 NOTE — PROGRESS NOTES
[facility] Dosing Services: 03/06/23  Apixaban dose change per pharmacy protocol  Physician Dr Vladimir Lomax  Indication: Afib  Previous Regimen Apixaban 5 mg po BID   Serum Creatinine Lab Results   Component Value Date/Time    Creatinine 1.89 (H) 03/06/2023 06:10 PM    Creatinine (POC) 1.20 02/22/2023 04:42 PM      Creatinine Clearance Estimated Creatinine Clearance: 19.9 mL/min (A) (based on SCr of 1.89 mg/dL (H)). BUN Lab Results   Component Value Date/Time    BUN 51 (H) 03/06/2023 06:10 PM         Nonvalvular atrial fibrillation (to prevent stroke and systemic embolism): Oral: 5 mg twice daily unless patient has any 2 of the following:   Age ? 80 years,   Body weight ?60 kg, or   Creatinine ?1.5 mg/dL,   then reduce dose to 2.5 mg twice daily.     Plan: Changed Apixaban to 2.5mg po BID    Thank you  Celina Hoskins, PharmD  [phone]

## 2023-03-07 NOTE — PROGRESS NOTES
[facility] [address]  [address] [facility], [address] [place] [address]  [phone]      Hospitalist Progress Note      NAME: Audrey Connors   :  1935  MRM:  [id number]    Date/Time of service: 3/7/2023  9:13 AM       Subjective:     Chief Complaint:  Patient was personally seen and examined by me during this time period. Chart reviewed. No chest pain, SOB       Objective:       Vitals:       Last 24hrs VS reviewed since prior progress note.  Most recent are:    Visit Vitals  BP (!) 97/45 (BP 1 Location: Left upper arm, BP Patient Position: At rest)   Pulse (!) 109   Temp 98.3 °F (36.8 °C)   Resp 20   SpO2 92%     SpO2 Readings from Last 6 Encounters:   23 92%   23 95%   23 96%   20 94%   17 98%   14 94%          Intake/Output Summary (Last 24 hours) at 3/7/2023 0913  Last data filed at 3/7/2023 9762  Gross per 24 hour   Intake 2184 ml   Output --   Net 2184 ml        Exam:     Physical Exam:    Gen:  Well-developed, well-nourished, obese, in no acute distress  HEENT:  Pink conjunctivae, PERRL, hearing intact to voice, moist mucous membranes  Neck:  Supple, without masses, thyroid non-tender  Resp:  No accessory muscle use, clear breath sounds without wheezes rales or rhonchi  Card:  No murmurs, irregular, normal S1, S2 without thrills, bruits or peripheral edema  Abd:  Soft, non-tender, non-distended, normoactive bowel sounds are present, surgical wound c/d/i  Musc:  No cyanosis or clubbing  Skin:  No rashes or  Neuro:  Cranial nerves 3-12 are grossly intact, follows commands appropriately  Psych:  Good insight, oriented to person, place and time, alert    Medications Reviewed: (see below)    Lab Data Reviewed: (see below)    ______________________________________________________________________    Medications:     Current Facility-Administered Medications   Medication Dose Route Frequency    albuterol-ipratropium (DUO-NEB) 2.5 MG-0.5 MG/3 ML  3 mL Nebulization Q6H PRN    cefTRIAXone (ROCEPHIN) 1 g in 0.9% sodium chloride 10 mL IV syringe  1 g IntraVENous Q24H    0.9% sodium chloride infusion  100 mL/hr IntraVENous CONTINUOUS    magnesium sulfate 2 g/50 ml IVPB (premix or compounded)  2 g IntraVENous ONCE    sodium chloride (NS) flush 5-10 mL  5-10 mL IntraVENous PRN    [Held by provider] amLODIPine (NORVASC) tablet 5 mg  5 mg Oral QAM    aspirin chewable tablet 81 mg  81 mg Oral DAILY    [Held by provider] lisinopriL (PRINIVIL, ZESTRIL) tablet 20 mg  20 mg Oral DAILY    [Held by provider] hydroCHLOROthiazide (HYDRODIURIL) tablet 25 mg  25 mg Oral QAM    pravastatin (PRAVACHOL) tablet 40 mg  40 mg Oral QHS    sodium chloride (NS) flush 5-40 mL  5-40 mL IntraVENous Q8H    sodium chloride (NS) flush 5-40 mL  5-40 mL IntraVENous PRN    acetaminophen (TYLENOL) tablet 650 mg  650 mg Oral Q6H PRN    Or    acetaminophen (TYLENOL) suppository 650 mg  650 mg Rectal Q6H PRN    polyethylene glycol (MIRALAX) packet 17 g  17 g Oral DAILY PRN    ondansetron (ZOFRAN ODT) tablet 4 mg  4 mg Oral Q8H PRN    Or    ondansetron (ZOFRAN) injection 4 mg  4 mg IntraVENous Q6H PRN    arformoteroL (BROVANA) neb solution 15 mcg  15 mcg Nebulization BID RT    ipratropium (ATROVENT) 0.02 % nebulizer solution 0.5 mg  0.5 mg Nebulization Q6H RT    metoprolol succinate (TOPROL-XL) XL tablet 25 mg  25 mg Oral DAILY    apixaban (ELIQUIS) tablet 2.5 mg  2.5 mg Oral BID     Current Outpatient Medications   Medication Sig    loratadine (CLARITIN) 10 mg tablet Take 10 mg by mouth Every morning. cholecalciferol, vitamin d3, 10 mcg (400 unit) cap Take 1 Tablet by mouth Every morning. tiotropium-olodateroL (Stiolto Respimat) 2.5-2.5 mcg/actuation inhaler Take 2 Puffs by inhalation Every morning. OTHER Administer 1 Drop to both eyes Every morning. Prednicort eye drops    aspirin 81 mg chewable tablet Take 1 tablet by mouth daily.     amLODIPine (NORVASC) 5 mg tablet Take 5 mg by mouth Every morning. benazepril (LOTENSIN) 20 mg tablet Take 20 mg by mouth Every morning. fish oil-omega-3 fatty acids 340-1,000 mg capsule Take 2 Capsules by mouth daily. pravastatin (PRAVACHOL) 40 mg tablet Take 40 mg by mouth nightly. hydrochlorothiazide (HYDRODIURIL) 25 mg tablet Take 25 mg by mouth Every morning. Lab Review:     Recent Labs     03/06/23  1810   WBC 9.2   HGB 10.8*   HCT 33.0*        Recent Labs     03/06/23  2146 03/06/23  1810   NA  --  133*   K  --  3.8   CL  --  100   CO2  --  26   GLU  --  114*   BUN  --  51*   CREA  --  1.89*   CA  --  8.8   MG 1.4*  --    ALB  --  2.2*   TBILI  --  0.6   ALT  --  20     Lab Results   Component Value Date/Time    Glucose (POC) 104 (H) 03/06/2023 06:57 PM    Glucose (POC) 130 (H) 03/03/2023 07:31 AM          Assessment / Plan:     81 yo hx of HTN, COPD, recent AAA repair, presented w/ weakness, hypotension, UTI, JT, new afib RVR    1) New onset afib RVR: likely precipitated by recent AAA repair, dehydration. Will check TSH, echo. Cont metoprolol, eliquis. Consult Cards    2) Hypotension: likely from dehydration. Will hold norvasc, ACEi, HCTZ. Cont IVF, monitor closely    3) JT: likely pre-renal.  Will check renal U/S. Cont IVF, monitor BMP. Consult Renal    4) UTI: monitor urine Cx. Start IV CTX    5) Recent AAA repair: will consult Vascular to eval    6) COPD: stable.   Use nebs prn    **Prior records, notes, labs, radiology, and medications reviewed in [address] [facility]**    Total time spent with patient care: 45 Minutes **I personally saw and examined the patient during this time period**                 Care Plan discussed with: Patient, nursing     Discussed:  Care Plan    Prophylaxis:   eliquis    Disposition:   PT, OT, RN           ___________________________________________________    Attending Physician: Marilu Hoskins MD

## 2023-03-07 NOTE — ED NOTES
Bedside and Verbal shift change report given to Eva Alexandra RN (oncoming nurse) by Jett Thrasher RN (offgoing nurse). Report included the following information SBAR, ED Summary, and MAR.

## 2023-03-07 NOTE — PROGRESS NOTES
3/7/2023  3:27 PM  CM completed  assessment w/ pt and her son Yaya Yepez in person. Charted demographics verified     Reason for Readmission:     AFib w/ RVR  Pt is a 81 yo female s/p EVAR 3/2/23 presents to [facility] from home c/o low BP  Admission [facility] 3/2 - 3/3/23 s/p EVAR was discharged to home w/ family assistance and formerly [facility], plan for outpatient follow up w/ vascular in 2 weeks      Past Medical History:   Diagnosis Date    Abdominal aneurysm 2023    Arthritis      Chronic obstructive pulmonary disease ([facility] [address]      Hyperlipidemia      Hypertension        RUR Score/Risk Level:     13 % Low Risk of Readmission/Green    PCP: First and Last name:  Nicole Moreno    Name of Practice:    Are you a current patient: Yes/No: yes    Approximate date of last visit:   < 30 days    Can you participate in a virtual visit with your PCP: yes with family assist    Is a Care Conference indicated: NO      Did you attend your follow up appointment (s): If not, why not:  pt had no PCP appt scheduled, was to see vascular 2 wks post-op         Resources/supports as identified by patient/family:   pt has supportive son and daughter-in-law who provide assistance PRN       Top Challenges facing patient (as identified by patient/family and CM): Finances/Medication cost?     [employer], pt uses [place] and is usually covered for her medications  Transportation     family, pt is able to drive    Support system or lack thereof? Pt has supportive son and DIL who provide assistance PRN   Living arrangements? Pt lives w/ son and DIL in a 2 story home w/ 4 MACIEL, there are 14 interior steps to her bedroom         Self-care/ADLs/Cognition?     Pt is ambulatory at baseline and independent w/ her ADLS, pt is AAO x 4       Current Advanced Directive/Advance Care Plan: FULL Code, no AMD on File,  NOK son Yaya Yepez [phone]           Plan for utilizing home health:   pt is currently open to [facility] for SN and PT and will need KARAN orders  DME: None          Transition of Care Plan:    Based on readmission, the patient's previous Plan of Care   has been evaluated and/or modified.  The current Transition of Care Plan is:       RUR 13 % Low Risk of Readmission/Green  LOS 1 Day     Hospital admission for medical management   Vascular following s/p EVAR POD # 5  Cardiology following   Renal following  PT eval to determine skilled needs at DC, open to [address] [place] [address] for SN,PT will need KARAN orders at DC   CM to follow through for treatment/response  DC when stabel to home w/ family assistance and [address] [place] [address]  Outpatient follow up vascular, cardiology,PCP  Family will transport home at DC     Readmission Assessment  Number of days since last admission?: 1-7 days (AAAS repair 3/2, DC    3/3/23 to home w/ [facility])  Previous disposition: Home with Home Health ([facility] SN,PT)  What was the patient's/caregiver's perception as to why they think they needed to return back to the hospital?: Other (Comment) (vital signs unstable)  Did you visit your Primary Care Physician after you left the hospital, before you returned this time?: No  Why weren't you able to visit your PCP?: Did not have an appointment (pt was to schedule)  Did you see a specialist, such as Cardiac, Pulmonary, Orthopedic Physician, etc. after you left the hospital?: Yes  Who advised the patient to return to the hospital?: [address] [facility] Staff  Does the patient report anything that got in the way of taking their medications?: No  In our efforts to provide the best possible care to you and others like you, can you think of anything that we could have done to help you after you left the hospital the first time, so that you might not have needed to return so soon?: Other (Comment) (Felt concerns over HR and BP were not addressed prior  to DC)  EDER Pathak

## 2023-03-07 NOTE — DISCHARGE INSTRUCTIONS
**Stiolto inhaler - patient supplied. Please return to patient at discharge. Thank you. **ACUTE DIAGNOSES:  Atrial fibrillation with RVR ([facility] [address] [I48.91]    CHRONIC MEDICAL DIAGNOSES:  [unfilled]    DISCHARGE MEDICATIONS:          It is important that you take the medication exactly as they are prescribed. Keep your medication in the bottles provided by the pharmacist and keep a list of the medication names, dosages, and times to be taken in your wallet. Do not take other medications without consulting your doctor. DIET:  Cardiac Diet    ACTIVITY: Activity as tolerated    ADDITIONAL INFORMATION: If you experience any of the following symptoms then please call your primary care physician or return to the emergency room if you cannot get hold of your doctor: Fever, chills, nausea, vomiting, diarrhea, change in mentation, falling, bleeding, shortness of breath. FOLLOW UP CARE:  Primary care physician  you are to call and set up an appointment to see them in 5 days. Follow-up with cardiology        Information obtained by :  I understand that if any problems occur once I am at home I am to contact my physician. I understand and acknowledge receipt of the instructions indicated above.                                                                                                                                            Physician's or R.N.'s Signature                                                                  Date/Time                                                                                                                                              Patient or Representative Signature                                                          Date/Time

## 2023-03-07 NOTE — PROGRESS NOTES
Problem: Falls - Risk of  Goal: *Absence of Falls  Description: Document Zelphia Dawson Fall Risk and appropriate interventions in the flowsheet.   Outcome: Progressing Towards Goal  Note: Fall Risk Interventions:                                Problem: Patient Education: Go to Patient Education Activity  Goal: Patient/Family Education  Outcome: Progressing Towards Goal

## 2023-03-07 NOTE — ED NOTES
Verbal shift change report given to Julissa King RN (oncoming nurse) by Jenny Shin RN (offgoing nurse). Report included the following information SBAR, ED Summary, MAR, Recent Results, and Cardiac Rhythm   .

## 2023-03-07 NOTE — PROGRESS NOTES
[facility]                    Cardiology Care Note     [x]Initial Encounter     []Follow-up    Patient Name: Lalo Lara - DPV:[id number] - CQP:[id number]  Primary Cardiologist: none   Consulting Cardiologist: [facility] Physicians: Juana Constantino MD     Reason for encounter: a fib RVR     HPI:       Lalo Lara is a 82 y.o. 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 For 2 days at home she had been having copious amts of diarrhea, not eating or drinking much and continued to taker her BP meds. She denies any chest pain, palpitations, coughing, wheezing or dyspnea. She has no fever, chills or night sweats. She has no recent weight gain or lower extremity edema. She has no dizziness or syncopal episodes. No previous a fib hx     ED finds a fib RVR/JT/hypomagnesemia: pt was given IV diltiazem 10 mg times 2, mag 2 grams and fluid bolus. Subjective:      Lalo Lara reports none. Assessment and Plan     1 Hx HTN now with hypotension: likely precipitated by dehydration/recent surgery. Hold home BP meds. Would replace norvasc with diltiazem if further rate control needed. 2 a fib RVR: agree with BB, replete lytes. Check TSH, ECHO. CHADs 2 Vasc score is 5, on eliquis lower dose. 3 recent endovascular AAA repair: Dr Rowe Ask to see. 4 JT: avoid nephrotoxic agents. Cont IV fluids.    5 electrolyte derangement: replete Mg and re check .            ____________________________________________________________    Cardiac testing                Most recent HS troponins:  Recent Labs     03/06/23  2146 03/06/23  1811   TROPHS 269* 345*     EKG Results       Procedure 720 Value Units Date/Time    EKG, 12 LEAD, INITIAL [[id number]] Collected: 03/06/23 1827    Order Status: Completed Updated: 03/07/23 8242     Ventricular Rate 117 BPM      Atrial Rate 108 BPM      QRS Duration 70 ms      Q-T Interval 290 ms      QTC Calculation (Bezet) 404 ms      Calculated R Axis 3 degrees      Calculated T Axis -13 degrees      Diagnosis --     Atrial fibrillation with rapid ventricular response with premature   ventricular or aberrantly conducted complexes  Nonspecific ST abnormality  Abnormal ECG  When compared with ECG of 22-FEB-2023 15:48,  Atrial fibrillation has replaced Sinus rhythm                Review of Systems:    [x]All other systems reviewed and all negative except as written in HPI    [] Patient unable to provide secondary to condition    Past Medical History:   Diagnosis Date    Abdominal aneurysm 2023    Arthritis     Chronic obstructive pulmonary disease ([facility] [address]     Hyperlipidemia     Hypertension      Past Surgical History:   Procedure Laterality Date    COLONOSCOPY N/A 12/13/2017    COLONOSCOPY, EGD performed by Temi Neil MD at [address] HEENT Bilateral 01/26/2023    cateract surgery    HX ORTHOPAEDIC Left 2013    ORIF left wrist     Social Hx:  reports that she quit smoking about 7 years ago. Her smoking use included cigarettes. She has a 35.00 pack-year smoking history. She has never used smokeless tobacco. She reports that she does not drink alcohol and does not use drugs. Family Hx: family history includes Cancer in her brother.   Allergies   Allergen Reactions    Tetracycline Other (comments)     Mouth soreness          OBJECTIVE:  Wt Readings from Last 3 Encounters:   03/03/23 72.8 kg (160 lb 7.9 oz)   02/22/23 69.4 kg (153 lb)   01/12/20 70.8 kg (156 lb)       Intake/Output Summary (Last 24 hours) at 3/7/2023 0839  Last data filed at 3/7/2023 0654  Gross per 24 hour   Intake 2184 ml   Output --   Net 2184 ml       Physical Exam:    Vitals:   Vitals:    03/07/23 0602 03/07/23 0700 03/07/23 0744 03/07/23 0755   BP: (!) 121/48   (!) 97/45   Pulse: 100 (!) 106  (!) 109   Resp: 19   20   Temp:    98.3 °F (36.8 °C)   SpO2: 95%  94% 92%     Telemetry: a fib 110     Gen: Well-developed, well-nourished, in no acute distress  Neck: Supple, No JVD, No Carotid Bruit  Resp: No accessory muscle use, Clear breath sounds, No rales or rhonchi  Card: irregular Rate and  Rhythm, Normal S1, S2, No murmurs, rubs or gallop. Abd:   Soft, non-tender, non-distended, BS+   MSK: No cyanosis  Skin: No rashes    Neuro: Moving all four extremities, follows commands appropriately  Psych: Good insight, oriented to person, place, alert, Nml Affect  LE: No edema    Data Review:     Radiology:   XR Results (most recent):  Results from Hospital Encounter encounter on 03/06/23    XR CHEST PORT    Narrative  EXAM:  XR CHEST PORT    INDICATION: Evaluation for infiltrate    COMPARISON: 2.22.2023    TECHNIQUE: portable chest AP view and portably at 1832 hours    FINDINGS: The cardiac silhouette is stable. The pulmonary vasculature is within  normal limits. There are several metallic fragments overlying the left upper  chest wall. Small hiatal hernia is present. The lungs and pleural spaces are hyperinflated but clear. The visualized bones  and upper abdomen are age-appropriate. Impression  Lung hyperinflation. No infiltrate. Incidental hiatal hernia      Recent Labs     03/06/23  1810   *   K 3.8      CO2 26   BUN 51*   CREA 1.89*   *   CA 8.8     Recent Labs     03/06/23  1810   WBC 9.2   HGB 10.8*   HCT 33.0*        Recent Labs     03/06/23  1810   AP 77     No results for input(s): CHOL, LDLC in the last 72 hours.     No lab exists for component: TGL, HDLC,  HBA1C      Current meds:    Current Facility-Administered Medications:     albuterol-ipratropium (DUO-NEB) 2.5 MG-0.5 MG/3 ML, 3 mL, Nebulization, Q6H PRN, Seth Salas MD    cefTRIAXone (ROCEPHIN) 1 g in 0.9% sodium chloride 10 mL IV syringe, 1 g, IntraVENous, Q24H, Seth Salas MD, 1 g at 03/07/23 0802    0.9% sodium chloride infusion, 75 mL/hr, IntraVENous, CONTINUOUS, Seth Salas MD, Last Rate: 75 mL/hr at 03/07/23 0801, 75 mL/hr at 03/07/23 0801    magnesium sulfate 2 g/50 ml IVPB (premix or compounded), 2 g, IntraVENous, ONCE, Seth Salas MD, Last Rate: 25 mL/hr at 03/07/23 0801, 2 g at 03/07/23 0801    sodium chloride (NS) flush 5-10 mL, 5-10 mL, IntraVENous, PRN, Darlyn Lacy MD    [Held by provider] amLODIPine (NORVASC) tablet 5 mg, 5 mg, Oral, QAM, Sindi Richter MD    aspirin chewable tablet 81 mg, 81 mg, Oral, DAILY, Geronimo Aranda MD, 81 mg at 03/07/23 0814    [Held by provider] lisinopriL (PRINIVIL, ZESTRIL) tablet 20 mg, 20 mg, Oral, DAILY, Sindi Richter MD    [Held by provider] hydroCHLOROthiazide (HYDRODIURIL) tablet 25 mg, 25 mg, Oral, QAM, Sindi Richter MD    pravastatin (PRAVACHOL) tablet 40 mg, 40 mg, Oral, QHS, Sindi Richter MD, 40 mg at 03/06/23 2117    sodium chloride (NS) flush 5-40 mL, 5-40 mL, IntraVENous, Q8H, Sindi Richter MD    sodium chloride (NS) flush 5-40 mL, 5-40 mL, IntraVENous, PRN, Sindi Richter MD    acetaminophen (TYLENOL) tablet 650 mg, 650 mg, Oral, Q6H PRN, 650 mg at 03/07/23 4062 **OR** acetaminophen (TYLENOL) suppository 650 mg, 650 mg, Rectal, Q6H PRN, Sindi Richter MD    polyethylene glycol (MIRALAX) packet 17 g, 17 g, Oral, DAILY PRN, Sindi Richter MD    ondansetron (ZOFRAN ODT) tablet 4 mg, 4 mg, Oral, Q8H PRN **OR** ondansetron (ZOFRAN) injection 4 mg, 4 mg, IntraVENous, Q6H PRN, Sindi Richter MD    arformoteroL (BROVANA) neb solution 15 mcg, 15 mcg, Nebulization, BID RT, Sindi Richter MD    ipratropium (ATROVENT) 0.02 % nebulizer solution 0.5 mg, 0.5 mg, Nebulization, Q6H RT, Sindi Richter MD    metoprolol succinate (TOPROL-XL) XL tablet 25 mg, 25 mg, Oral, DAILY, Sindi Richter MD, 25 mg at 03/06/23 2020    apixaban (ELIQUIS) tablet 2.5 mg, 2.5 mg, Oral, BID, Sindi Richter MD, 2.5 mg at 03/07/23 5231    Current Outpatient Medications:     loratadine (CLARITIN) 10 mg tablet, Take 10 mg by mouth Every morning., Disp: , Rfl:     cholecalciferol, vitamin d3, 10 mcg (400 unit) cap, Take 1 Tablet by mouth Every morning., Disp: , Rfl:     tiotropium-olodateroL (Stiolto Respimat) 2.5-2.5 mcg/actuation inhaler, Take 2 Puffs by inhalation Every morning., Disp: , Rfl:     OTHER, Administer 1 Drop to both eyes Every morning. Prednicort eye drops, Disp: , Rfl:     aspirin 81 mg chewable tablet, Take 1 tablet by mouth daily. , Disp: 30 tablet, Rfl: 0    amLODIPine (NORVASC) 5 mg tablet, Take 5 mg by mouth Every morning., Disp: , Rfl:     benazepril (LOTENSIN) 20 mg tablet, Take 20 mg by mouth Every morning., Disp: , Rfl:     fish oil-omega-3 fatty acids 340-1,000 mg capsule, Take 2 Capsules by mouth daily. , Disp: , Rfl:     pravastatin (PRAVACHOL) 40 mg tablet, Take 40 mg by mouth nightly., Disp: , Rfl:     hydrochlorothiazide (HYDRODIURIL) 25 mg tablet, Take 25 mg by mouth Every morning., Disp: , Rfl:     Sophie Stanley NP    [facility]  Call center: [phone] (f) [phone]      Serge Eddy MD

## 2023-03-07 NOTE — PROGRESS NOTES
TRANSFER - OUT REPORT:    Verbal report given to MICHELA Brannon(name) on Jose Pope  being transferred to [facility], room 331 (unit) for routine progression of care       Report consisted of patients Situation, Background, Assessment and   Recommendations(SBAR). Information from the following report(s) SBAR, Kardex, Intake/Output, MAR, and Recent Results was reviewed with the receiving nurse. Lines:   Peripheral IV 03/06/23 Right Antecubital (Active)       Peripheral IV 03/06/23 Left Antecubital (Active)        Opportunity for questions and clarification was provided.       Patient transported with:   [facility]

## 2023-03-07 NOTE — CONSULTS
Vascular Surgery Consult Note  3/7/2023    Subjective:     Tabatha Azul is a 82 y.o. female is she s/p recent EVAR (3/2/23). Surgery was done without difficulty or complication. Postoperative course was unremarkable and was discharged home in good condition. Unfortunately she developed diarrhea over the past two days and presented to the ER with dehydration. She was found to be in afib RVR and with JT. Patient seen and examined. She continues with diarrhea and abdominal cramping. No incision issues. No pain to legs. Pulses intact. No other complaints offered. Past Medical History:   Diagnosis Date    Abdominal aneurysm     Arthritis     Chronic obstructive pulmonary disease ([facility] [address]     Hyperlipidemia     Hypertension       Past Surgical History:   Procedure Laterality Date    COLONOSCOPY N/A 2017    COLONOSCOPY, EGD performed by Mvais Mcgovern MD at [facility] [address]    HX HEENT Bilateral 2023    cateract surgery    HX ORTHOPAEDIC Left     ORIF left wrist     Family History   Problem Relation Age of Onset    Cancer Brother       Social History     Tobacco Use    Smoking status: Former     Packs/day: 1.00     Years: 35.00     Pack years: 35.00     Types: Cigarettes     Quit date: 2015     Years since quittin.2    Smokeless tobacco: Never   Substance Use Topics    Alcohol use: No       Prior to Admission medications    Medication Sig Start Date End Date Taking? Authorizing Provider   HYDROcodone-acetaminophen (NORCO) 5-325 mg per tablet Take 1 Tablet by mouth every four (4) hours as needed for Pain for up to 3 days. Max Daily Amount: 6 Tablets. 3/3/23 3/6/23  Babatunde Loya MD   loratadine (CLARITIN) 10 mg tablet Take 10 mg by mouth Every morning. Provider, Historical   cholecalciferol, vitamin d3, 10 mcg (400 unit) cap Take 1 Tablet by mouth Every morning.     Provider, Historical   tiotropium-olodateroL (Stiolto Respimat) 2.5-2.5 mcg/actuation inhaler Take 2 Puffs by inhalation Every morning. Provider, Historical   OTHER Administer 1 Drop to both eyes Every morning. Prednicort eye drops    Provider, Historical   aspirin 81 mg chewable tablet Take 1 tablet by mouth daily. 9/9/14   Shan Rubio MD   amLODIPine (NORVASC) 5 mg tablet Take 5 mg by mouth Every morning. Provider, Historical   benazepril (LOTENSIN) 20 mg tablet Take 20 mg by mouth Every morning. Provider, Historical   fish oil-omega-3 fatty acids 340-1,000 mg capsule Take 2 Capsules by mouth daily. Provider, Historical   pravastatin (PRAVACHOL) 40 mg tablet Take 40 mg by mouth nightly. Other, MD Vanessa   hydrochlorothiazide (HYDRODIURIL) 25 mg tablet Take 25 mg by mouth Every morning. Other, MD Vanessa     Allergies   Allergen Reactions    Tetracycline Other (comments)     Mouth soreness        Review of Systems   Constitutional: Negative. Respiratory: Negative. Cardiovascular: Negative. Gastrointestinal:  Positive for diarrhea. Skin: Negative. Neurological: Negative. Objective:     Patient Vitals for the past 24 hrs:   BP Temp Pulse Resp SpO2   03/07/23 1057 (!) 91/43 98.1 °F (36.7 °C) (!) 113 21 94 %   03/07/23 0755 (!) 97/45 98.3 °F (36.8 °C) (!) 109 20 92 %   03/07/23 0744 -- -- -- -- 94 %   03/07/23 0700 -- -- (!) 106 -- --   03/07/23 0602 (!) 121/48 -- 100 19 95 %   03/07/23 0455 (!) 98/45 -- 95 18 93 %   03/07/23 0412 (!) 100/50 -- (!) 101 -- --   03/07/23 0400 (!) 88/34 -- (!) 111 20 93 %   03/07/23 0337 (!) 91/46 -- (!) 108 20 93 %   03/07/23 0155 (!) 100/18 -- 99 18 93 %   03/06/23 2355 (!) 99/35 -- (!) 123 24 94 %   03/06/23 2305 (!) 115/47 -- (!) 101 23 95 %   03/06/23 2303 -- -- (!) 102 -- --   03/06/23 2155 (!) 107/54 -- (!) 103 23 92 %   03/06/23 2117 (!) 119/54 -- (!) 123 -- --   03/06/23 2020 122/71 -- (!) 141 -- --   03/06/23 1740 101/64 97.9 °F (36.6 °C) 85 20 100 %        Physical Exam  Constitutional:       General: She is not in acute distress.   Cardiovascular:      Rate and Rhythm: Rhythm irregular. Pulses:           Dorsalis pedis pulses are 2+ on the right side and 2+ on the left side. Pulmonary:      Effort: Pulmonary effort is normal. No respiratory distress. Abdominal:      General: There is no distension. Palpations: Abdomen is soft. Skin:     General: Skin is warm and dry. Comments: Bilateral groin incisions c/d/I. No concerns for hematoma or infection. Neurological:      General: No focal deficit present. Mental Status: She is alert. Mental status is at baseline. Assessment/Plan:     AAA  - s/p EVAR 3/2/23. Continue with scheduled follow up she has with Dr. Alycia Corral 3/20. Afib RVR   - new onset, on anticoagulation and BB. As per cardiology and primary     JT   - On IVF. Nephrology consulted and following.      Signed By: Aroldo Trotter NP     March 7, 2023

## 2023-03-08 ENCOUNTER — APPOINTMENT (OUTPATIENT)
Dept: NON INVASIVE DIAGNOSTICS | Age: 88
End: 2023-03-08
Attending: INTERNAL MEDICINE
Payer: MEDICARE

## 2023-03-08 LAB
ANION GAP SERPL CALC-SCNC: 5 MMOL/L (ref 5–15)
BUN SERPL-MCNC: 37 MG/DL (ref 6–20)
BUN/CREAT SERPL: 36 (ref 12–20)
CALCIUM SERPL-MCNC: 8.2 MG/DL (ref 8.5–10.1)
CHLORIDE SERPL-SCNC: 107 MMOL/L (ref 97–108)
CO2 SERPL-SCNC: 24 MMOL/L (ref 21–32)
CREAT SERPL-MCNC: 1.02 MG/DL (ref 0.55–1.02)
ECHO AO ASC DIAM: 3.1 CM
ECHO AO ASCENDING AORTA INDEX: 1.79 CM/M2
ECHO AV AREA PEAK VELOCITY: 2.8 CM2
ECHO AV AREA VTI: 2.7 CM2
ECHO AV AREA/BSA PEAK VELOCITY: 1.6 CM2/M2
ECHO AV AREA/BSA VTI: 1.6 CM2/M2
ECHO AV MEAN GRADIENT: 4 MMHG
ECHO AV MEAN VELOCITY: 1 M/S
ECHO AV PEAK GRADIENT: 8 MMHG
ECHO AV PEAK VELOCITY: 1.5 M/S
ECHO AV VELOCITY RATIO: 0.67
ECHO AV VTI: 23.6 CM
ECHO EST RA PRESSURE: 8 MMHG
ECHO LA DIAMETER INDEX: 1.73 CM/M2
ECHO LA DIAMETER: 3 CM
ECHO LA VOL 2C: 30 ML (ref 22–52)
ECHO LA VOL 4C: 30 ML (ref 22–52)
ECHO LA VOL BP: 30 ML (ref 22–52)
ECHO LA VOL/BSA BIPLANE: 17 ML/M2 (ref 16–34)
ECHO LA VOLUME AREA LENGTH: 32 ML
ECHO LA VOLUME INDEX A2C: 17 ML/M2 (ref 16–34)
ECHO LA VOLUME INDEX A4C: 17 ML/M2 (ref 16–34)
ECHO LA VOLUME INDEX AREA LENGTH: 18 ML/M2 (ref 16–34)
ECHO LV E' LATERAL VELOCITY: 13 CM/S
ECHO LV E' SEPTAL VELOCITY: 11 CM/S
ECHO LV FRACTIONAL SHORTENING: 40 % (ref 28–44)
ECHO LV INTERNAL DIMENSION DIASTOLE INDEX: 2.31 CM/M2
ECHO LV INTERNAL DIMENSION DIASTOLIC: 4 CM (ref 3.9–5.3)
ECHO LV INTERNAL DIMENSION SYSTOLIC INDEX: 1.39 CM/M2
ECHO LV INTERNAL DIMENSION SYSTOLIC: 2.4 CM
ECHO LV IVSD: 0.8 CM (ref 0.6–0.9)
ECHO LV MASS 2D: 93.5 G (ref 67–162)
ECHO LV MASS INDEX 2D: 54 G/M2 (ref 43–95)
ECHO LV POSTERIOR WALL DIASTOLIC: 0.8 CM (ref 0.6–0.9)
ECHO LV RELATIVE WALL THICKNESS RATIO: 0.4
ECHO LVOT AREA: 3.8 CM2
ECHO LVOT AV VTI INDEX: 0.7
ECHO LVOT DIAM: 2.2 CM
ECHO LVOT MEAN GRADIENT: 2 MMHG
ECHO LVOT PEAK GRADIENT: 4 MMHG
ECHO LVOT PEAK VELOCITY: 1 M/S
ECHO LVOT STROKE VOLUME INDEX: 36.5 ML/M2
ECHO LVOT SV: 63.1 ML
ECHO LVOT VTI: 16.6 CM
ECHO MV E DECELERATION TIME (DT): 164.6 MS
ECHO MV E VELOCITY: 1.31 M/S
ECHO MV E/E' LATERAL: 10.08
ECHO MV E/E' RATIO (AVERAGED): 10.99
ECHO MV E/E' SEPTAL: 11.91
ECHO MV REGURGITANT PEAK GRADIENT: 121 MMHG
ECHO MV REGURGITANT PEAK VELOCITY: 5.5 M/S
ECHO PULMONARY ARTERY END DIASTOLIC PRESSURE: 10 MMHG
ECHO PV MAX VELOCITY: 1.3 M/S
ECHO PV PEAK GRADIENT: 7 MMHG
ECHO PV REGURGITANT MAX VELOCITY: 1.6 M/S
ECHO RIGHT VENTRICULAR SYSTOLIC PRESSURE (RVSP): 46 MMHG
ECHO RV FREE WALL PEAK S': 14 CM/S
ECHO RV INTERNAL DIMENSION: 3.3 CM
ECHO RV TAPSE: 1.8 CM (ref 1.7–?)
ECHO TV REGURGITANT MAX VELOCITY: 3.07 M/S
ECHO TV REGURGITANT PEAK GRADIENT: 38 MMHG
ERYTHROCYTE [DISTWIDTH] IN BLOOD BY AUTOMATED COUNT: 13.5 % (ref 11.5–14.5)
GLUCOSE SERPL-MCNC: 108 MG/DL (ref 65–100)
HCT VFR BLD AUTO: 29.1 % (ref 35–47)
HGB BLD-MCNC: 9.6 G/DL (ref 11.5–16)
MAGNESIUM SERPL-MCNC: 2 MG/DL (ref 1.6–2.4)
MCH RBC QN AUTO: 32.7 PG (ref 26–34)
MCHC RBC AUTO-ENTMCNC: 33 G/DL (ref 30–36.5)
MCV RBC AUTO: 99 FL (ref 80–99)
NRBC # BLD: 0 K/UL (ref 0–0.01)
NRBC BLD-RTO: 0 PER 100 WBC
PHOSPHATE SERPL-MCNC: 2.5 MG/DL (ref 2.6–4.7)
PLATELET # BLD AUTO: 227 K/UL (ref 150–400)
PMV BLD AUTO: 9.8 FL (ref 8.9–12.9)
POTASSIUM SERPL-SCNC: 3 MMOL/L (ref 3.5–5.1)
RBC # BLD AUTO: 2.94 M/UL (ref 3.8–5.2)
SODIUM SERPL-SCNC: 136 MMOL/L (ref 136–145)
SODIUM UR-SCNC: 50 MMOL/L
TROPONIN I SERPL HS-MCNC: 122 NG/L (ref 0–51)
UUN UR-MCNC: 941 MG/DL
WBC # BLD AUTO: 8.6 K/UL (ref 3.6–11)

## 2023-03-08 PROCEDURE — 80048 BASIC METABOLIC PNL TOTAL CA: CPT

## 2023-03-08 PROCEDURE — APPSS30 APP SPLIT SHARED TIME 16-30 MINUTES: Performed by: NURSE PRACTITIONER

## 2023-03-08 PROCEDURE — 65270000029 HC RM PRIVATE

## 2023-03-08 PROCEDURE — 84100 ASSAY OF PHOSPHORUS: CPT

## 2023-03-08 PROCEDURE — 74011250636 HC RX REV CODE- 250/636: Performed by: INTERNAL MEDICINE

## 2023-03-08 PROCEDURE — 99233 SBSQ HOSP IP/OBS HIGH 50: CPT | Performed by: INTERNAL MEDICINE

## 2023-03-08 PROCEDURE — 94640 AIRWAY INHALATION TREATMENT: CPT

## 2023-03-08 PROCEDURE — 36415 COLL VENOUS BLD VENIPUNCTURE: CPT

## 2023-03-08 PROCEDURE — 93306 TTE W/DOPPLER COMPLETE: CPT | Performed by: STUDENT IN AN ORGANIZED HEALTH CARE EDUCATION/TRAINING PROGRAM

## 2023-03-08 PROCEDURE — 74011000250 HC RX REV CODE- 250: Performed by: INTERNAL MEDICINE

## 2023-03-08 PROCEDURE — 83735 ASSAY OF MAGNESIUM: CPT

## 2023-03-08 PROCEDURE — 94664 DEMO&/EVAL PT USE INHALER: CPT

## 2023-03-08 PROCEDURE — 74011250637 HC RX REV CODE- 250/637: Performed by: INTERNAL MEDICINE

## 2023-03-08 PROCEDURE — 74011250637 HC RX REV CODE- 250/637: Performed by: NURSE PRACTITIONER

## 2023-03-08 PROCEDURE — 93306 TTE W/DOPPLER COMPLETE: CPT

## 2023-03-08 PROCEDURE — 84484 ASSAY OF TROPONIN QUANT: CPT

## 2023-03-08 PROCEDURE — 85027 COMPLETE CBC AUTOMATED: CPT

## 2023-03-08 RX ORDER — SODIUM,POTASSIUM PHOSPHATES 280-250MG
2 POWDER IN PACKET (EA) ORAL 2 TIMES DAILY
Status: DISPENSED | OUTPATIENT
Start: 2023-03-08 | End: 2023-03-09

## 2023-03-08 RX ORDER — POTASSIUM CHLORIDE 750 MG/1
20 TABLET, FILM COATED, EXTENDED RELEASE ORAL EVERY 4 HOURS
Status: COMPLETED | OUTPATIENT
Start: 2023-03-08 | End: 2023-03-08

## 2023-03-08 RX ORDER — AMIODARONE HYDROCHLORIDE 200 MG/1
400 TABLET ORAL 2 TIMES DAILY
Status: DISCONTINUED | OUTPATIENT
Start: 2023-03-08 | End: 2023-03-10 | Stop reason: HOSPADM

## 2023-03-08 RX ADMIN — APIXABAN 2.5 MG: 2.5 TABLET, FILM COATED ORAL at 22:47

## 2023-03-08 RX ADMIN — POTASSIUM CHLORIDE 20 MEQ: 750 TABLET, FILM COATED, EXTENDED RELEASE ORAL at 09:19

## 2023-03-08 RX ADMIN — SODIUM CHLORIDE, PRESERVATIVE FREE 10 ML: 5 INJECTION INTRAVENOUS at 06:43

## 2023-03-08 RX ADMIN — ACETAMINOPHEN 650 MG: 325 TABLET ORAL at 23:05

## 2023-03-08 RX ADMIN — SODIUM CHLORIDE, PRESERVATIVE FREE 10 ML: 5 INJECTION INTRAVENOUS at 22:47

## 2023-03-08 RX ADMIN — SODIUM CHLORIDE 100 ML/HR: 9 INJECTION, SOLUTION INTRAMUSCULAR; INTRAVENOUS; SUBCUTANEOUS at 06:44

## 2023-03-08 RX ADMIN — SODIUM CHLORIDE, PRESERVATIVE FREE 10 ML: 5 INJECTION INTRAVENOUS at 11:16

## 2023-03-08 RX ADMIN — POTASSIUM PHOSPHATE, MONOBASIC POTASSIUM PHOSPHATE, DIBASIC: 224; 236 INJECTION, SOLUTION, CONCENTRATE INTRAVENOUS at 11:15

## 2023-03-08 RX ADMIN — APIXABAN 2.5 MG: 2.5 TABLET, FILM COATED ORAL at 09:19

## 2023-03-08 RX ADMIN — Medication 2 PACKET: at 17:43

## 2023-03-08 RX ADMIN — POTASSIUM CHLORIDE 20 MEQ: 750 TABLET, FILM COATED, EXTENDED RELEASE ORAL at 11:15

## 2023-03-08 RX ADMIN — AMIODARONE HYDROCHLORIDE 400 MG: 200 TABLET ORAL at 09:19

## 2023-03-08 RX ADMIN — PRAVASTATIN SODIUM 40 MG: 20 TABLET ORAL at 22:47

## 2023-03-08 RX ADMIN — CEFTRIAXONE 1 G: 1 INJECTION, POWDER, FOR SOLUTION INTRAMUSCULAR; INTRAVENOUS at 09:20

## 2023-03-08 RX ADMIN — AMIODARONE HYDROCHLORIDE 400 MG: 200 TABLET ORAL at 17:43

## 2023-03-08 NOTE — PROGRESS NOTES
Elver Russell chitra [place] [address]  [address] [place] [address], [address] [place] [address], [address] [place] [address]  [phone]      Hospitalist Progress Note      NAME: Darren Clemente   :  1935  MRM:  [id number]    Date/Time of service: 3/8/2023  9:56 AM       Subjective:     Chief Complaint:  Patient was personally seen and examined by me during this time period. Chart reviewed. Still in Afib. Spoke to family at bedside        Objective:       Vitals:       Last 24hrs VS reviewed since prior progress note.  Most recent are:    Visit Vitals  /71 (BP 1 Location: Right upper arm, BP Patient Position: At rest)   Pulse (!) 108   Temp 98.8 °F (37.1 °C)   Resp 15   Ht 5' 1\" (1.549 m)   Wt 73.9 kg (162 lb 14.7 oz)   SpO2 93%   BMI 30.78 kg/m²     SpO2 Readings from Last 6 Encounters:   23 93%   23 95%   23 96%   20 94%   17 98%   14 94%          Intake/Output Summary (Last 24 hours) at 3/8/2023 0956  Last data filed at 3/8/2023 0605  Gross per 24 hour   Intake 321 ml   Output 500 ml   Net -179 ml          Exam:     Physical Exam:    Gen:  Well-developed, well-nourished, obese, in no acute distress  HEENT:  Pink conjunctivae, PERRL, hearing intact to voice, moist mucous membranes  Neck:  Supple, without masses, thyroid non-tender  Resp:  No accessory muscle use, clear breath sounds without wheezes rales or rhonchi  Card:  No murmurs, irregular, normal S1, S2 without thrills, bruits or peripheral edema  Abd:  Soft, non-tender, non-distended, normoactive bowel sounds are present, surgical wound c/d/i  Musc:  No cyanosis or clubbing  Skin:  No rashes or  Neuro:  Cranial nerves 3-12 are grossly intact, follows commands appropriately  Psych:  Good insight, oriented to person, place and time, alert    Medications Reviewed: (see below)    Lab Data Reviewed: (see below)    ______________________________________________________________________    Medications:     Current Facility-Administered Medications Medication Dose Route Frequency    potassium chloride SR (KLOR-CON 10) tablet 20 mEq  20 mEq Oral Q4H    amiodarone (CORDARONE) tablet 400 mg  400 mg Oral BID    potassium phosphate 30 mmol in 0.9% sodium chloride 250 mL infusion   IntraVENous ONCE    albuterol-ipratropium (DUO-NEB) 2.5 MG-0.5 MG/3 ML  3 mL Nebulization Q6H PRN    cefTRIAXone (ROCEPHIN) 1 g in 0.9% sodium chloride 10 mL IV syringe  1 g IntraVENous Q24H    0.9% sodium chloride infusion  75 mL/hr IntraVENous CONTINUOUS    tiotropium-olodateroL (STIOLTO RESPIMAT) 2.5-2.5 mcg/actuation inhaler 2 Puff (Patient Supplied)  2 Puff Inhalation DAILY    sodium chloride (NS) flush 5-10 mL  5-10 mL IntraVENous PRN    amLODIPine (NORVASC) tablet 5 mg  5 mg Oral QAM    [Held by provider] lisinopriL (PRINIVIL, ZESTRIL) tablet 20 mg  20 mg Oral DAILY    [Held by provider] hydroCHLOROthiazide (HYDRODIURIL) tablet 25 mg  25 mg Oral QAM    pravastatin (PRAVACHOL) tablet 40 mg  40 mg Oral QHS    sodium chloride (NS) flush 5-40 mL  5-40 mL IntraVENous Q8H    sodium chloride (NS) flush 5-40 mL  5-40 mL IntraVENous PRN    acetaminophen (TYLENOL) tablet 650 mg  650 mg Oral Q6H PRN    Or    acetaminophen (TYLENOL) suppository 650 mg  650 mg Rectal Q6H PRN    polyethylene glycol (MIRALAX) packet 17 g  17 g Oral DAILY PRN    ondansetron (ZOFRAN ODT) tablet 4 mg  4 mg Oral Q8H PRN    Or    ondansetron (ZOFRAN) injection 4 mg  4 mg IntraVENous Q6H PRN    [Held by provider] metoprolol succinate (TOPROL-XL) XL tablet 25 mg  25 mg Oral DAILY    apixaban (ELIQUIS) tablet 2.5 mg  2.5 mg Oral BID          Lab Review:     Recent Labs     03/08/23  0006 03/06/23  1810   WBC 8.6 9.2   HGB 9.6* 10.8*   HCT 29.1* 33.0*    213       Recent Labs     03/08/23  0006 03/07/23  1030 03/06/23  2146 03/06/23  1810    135*  --  133*   K 3.0* 3.5  --  3.8    105  --  100   CO2 24 23  --  26   * 113*  --  114*   BUN 37* 45*  --  51*   CREA 1.02 1.20*  --  1.89*   CA 8.2* 8.4*  --  8.8   MG 2.0  --  1.4*  --    PHOS 2.5*  --   --   --    ALB  --   --   --  2.2*   TBILI  --   --   --  0.6   ALT  --   --   --  20       Lab Results   Component Value Date/Time    Glucose (POC) 104 (H) 03/06/2023 06:57 PM    Glucose (POC) 130 (H) 03/03/2023 07:31 AM          Assessment / Plan:     83 yo hx of HTN, COPD, recent AAA repair, presented w/ weakness, hypotension, UTI, JT, new afib RVR    1) New onset afib RVR: likely precipitated by recent AAA repair, dehydration. TSH wnl.  Echo pending . Cont Amio, eliquis. BP too low for BB. Cards following    2) Hypotension: slowly improving. Likely from dehydration. Holding metoprolol, norvasc, ACEi, HCTZ. Cont IVF, monitor closely    3) JT: improving. Likely pre-renal.  Cont IVF, monitor BMP. Renal following    4) UTI: urine Cx w/ gram neg. Cont IV CTX    5) Recent AAA repair: Vasc following, no acute issues. Follow up with Dr. Sue Sarabia    6) COPD: stable.   Use nebs prn    7) HypoK/phos: replete IV    **Prior records, notes, labs, radiology, and medications reviewed in [facility]**    Total time spent with patient care: 35 Minutes **I personally saw and examined the patient during this time period**                 Care Plan discussed with: Patient, nursing     Discussed:  Care Plan    Prophylaxis:   eliquis    Disposition:   PT, OT, RN           ___________________________________________________    Attending Physician: Sheba Wen MD

## 2023-03-08 NOTE — PROGRESS NOTES
3/8/2023  11:32 AM  Pt emergently admitted 3/6/23 for AFib w/ RVR discussed in IDR is continuing to require medical management for new onset AFib, hypotension, JT, UTI, recent AAA repair, COPD   Transition of Care Plan:     Pt is readmission to the hospital     RUR 13 % Low Risk of Readmission/Green  LOS 1 Day      medical management continues   Vascular following s/p EVAR POD # 6  Cardiology following   Renal following  PT eval to determine skilled needs at [place], open to [facility] for SN,PT,  CM to follow through for treatment/response  DC when stabel to home w/ family assistance and [facility], resumption orders sen [facility]  Outpatient follow up vascular, cardiology,PCP  Family will transport home at [facility]

## 2023-03-08 NOTE — PROGRESS NOTES
2000 Hour: MEWS Score 3 d/t elevated heeart rate. Will monitor heart rate q 4 hours. And prn. Patient is on telemetry monitor #329 Patient in A Fib rhythm. 000 Hour: MEW Score 3 d/t elevated heart rate and blood pressure 109/52 (MAP)71 Plan is to continue to monitor vital signs. Troponin 122.  0400 Hour:  MEWS Score 3 d/t elevated hr and soft B/P 91/50 (MAP 64), . A Fib rhythm. SpO2 90%. 0445 Hour: B/P rechecked 113/64 (MAP 80),  A Fib. SpO2 91% room air. 0730 Hour Bedside and Verbal shift change report given to Rianna Nichols (oncoming nurse) by Nikolay Magana RN (offgoing nurse). Report included the following information SBAR, Kardex, Intake/Output, MAR, Recent Results, and Cardiac Rhythm A Fib rhythm. Uncontrolled .

## 2023-03-08 NOTE — PROGRESS NOTES
NEPHROLOGY CONSULT NOTE     Patient: Jamari Khan MRN: [id number]  PCP: Sheridan Hauser MD   :     1935  Age:   82 y.o. Sex:  female      Referring physician: Zackery Banuelos MD  Reason for consultation:   Admission Date: 3/6/2023  5:45 PM  LOS: 2 days        ASSESSMENT and PLAN :   TJ - likely pre-renal state in the setting of hypotension  Hypotension  History of AAA repair    -Serum creatinine/GFR normalized  -DC IV fluid  -Encourage oral intake  -Resume amlodipine  -Hold HCTZ and ACE inhibitor  -Plan discussed with patient and son at bedside             Subjective:   HPI:   Feeling better. Creatinine is 1.0, on IV fluid    Past Medical Hx:   Past Medical History:   Diagnosis Date    Abdominal aneurysm     Arthritis     Chronic obstructive pulmonary disease ([facility] [address]     Hyperlipidemia     Hypertension         Past Surgical Hx:     Past Surgical History:   Procedure Laterality Date    COLONOSCOPY N/A 2017    COLONOSCOPY, EGD performed by Marilynn Hummel MD at [address] HEENT Bilateral 2023    cateract surgery    HX ORTHOPAEDIC Left     ORIF left wrist       Medications:  Prior to Admission medications    Medication Sig Start Date End Date Taking? Authorizing Provider   loratadine (CLARITIN) 10 mg tablet Take 10 mg by mouth Every morning. Provider, Historical   cholecalciferol, vitamin d3, 10 mcg (400 unit) cap Take 1 Tablet by mouth Every morning. Provider, Historical   tiotropium-olodateroL (Stiolto Respimat) 2.5-2.5 mcg/actuation inhaler Take 2 Puffs by inhalation Every morning. Provider, Historical   OTHER Administer 1 Drop to both eyes Every morning. Prednicort eye drops    Provider, Historical   aspirin 81 mg chewable tablet Take 1 tablet by mouth daily. 14   Gutierrez Seymour MD   amLODIPine (NORVASC) 5 mg tablet Take 5 mg by mouth Every morning. Provider, Historical   benazepril (LOTENSIN) 20 mg tablet Take 20 mg by mouth Every morning.     Provider, Historical fish oil-omega-3 fatty acids 340-1,000 mg capsule Take 2 Capsules by mouth daily. Provider, Nemo   pravastatin (PRAVACHOL) 40 mg tablet Take 40 mg by mouth nightly. OtherVanessa MD   hydrochlorothiazide (HYDRODIURIL) 25 mg tablet Take 25 mg by mouth Every morning. OtherVanessa MD       Allergies   Allergen Reactions    Tetracycline Other (comments)     Mouth soreness       Social Hx:  reports that she quit smoking about 7 years ago. Her smoking use included cigarettes. She has a 35.00 pack-year smoking history. She has never used smokeless tobacco. She reports that she does not drink alcohol and does not use drugs. Family History   Problem Relation Age of Onset    Cancer Brother        Review of Systems:  A twelve point review of system was performed today. Pertinent positives and negatives are mentioned in the HPI. The reminder of the ROS is negative and noncontributory.      Objective:    Vitals:    Vitals:    03/08/23 0721 03/08/23 0802 03/08/23 0818 03/08/23 1140   BP: 113/64  130/71 114/70   Pulse:   (!) 108 (!) 115   Resp:   15 17   Temp:   98.8 °F (37.1 °C) 98.1 °F (36.7 °C)   SpO2:  92% 93% 94%   Weight: 73.9 kg (162 lb 14.7 oz)      Height: 5' 1\" (1.549 m)        I&O's:  03/07 0701 - 03/08 0700  In: 100 [P.O.:100]  Out: 300 [Urine:300]  Visit Vitals  /70 (BP 1 Location: Right upper arm, BP Patient Position: At rest)   Pulse (!) 115 Comment: RN notified   Temp 98.1 °F (36.7 °C)   Resp 17   Ht 5' 1\" (1.549 m)   Wt 73.9 kg (162 lb 14.7 oz)   SpO2 94%   BMI 30.78 kg/m²       Physical Exam:    GENERAL : Lying down in bed with no acute distress  HEENT: AT NC PEERLA   NECK: Supple no JVP  CVS: S1 S2 RRR, no murmur or gallops heard  RS: CTABL, no rhonchi,or wheezing heard  ABDOMEN: soft NT ND positive BS  EXTREMITY: No edema clubbing or cyanosis, pedal pulse +  NEUROLOGY: AAA X3, no focal deficit or asterixis  VASCULAR ACCESS:      Laboratory Results:    BMP:   Lab Results   Component Value Date/Time     03/08/2023 12:06 AM    K 3.0 (L) 03/08/2023 12:06 AM     03/08/2023 12:06 AM    CO2 24 03/08/2023 12:06 AM    AGAP 5 03/08/2023 12:06 AM     (H) 03/08/2023 12:06 AM    BUN 37 (H) 03/08/2023 12:06 AM    CREA 1.02 03/08/2023 12:06 AM         No results found for this or any previous visit. CBC W/O DIFF    Collection Time: 03/08/23 12:06 AM   Result Value Ref Range    WBC 8.6 3.6 - 11.0 K/uL    RBC 2.94 (L) 3.80 - 5.20 M/uL    HGB 9.6 (L) 11.5 - 16.0 g/dL    HCT 29.1 (L) 35.0 - 47.0 %    MCV 99.0 80.0 - 99.0 FL    MCH 32.7 26.0 - 34.0 PG    MCHC 33.0 30.0 - 36.5 g/dL    RDW 13.5 11.5 - 14.5 %    PLATELET 906 603 - 836 K/uL    MPV 9.8 8.9 - 12.9 FL    NRBC 0.0 0  WBC    ABSOLUTE NRBC 0.00 0.00 - 0.01 K/uL          Thank you for consulting [facility] in the care of your patient.

## 2023-03-08 NOTE — PROGRESS NOTES
[address] [facility]                    Cardiology Care Note     []Initial Encounter     [x]Follow-up    Patient Name: Darlyn Calderon - FUI:[id number] - SIE:[id number]  Primary Cardiologist: none   Consulting Cardiologist: [facility] Physicians: Hira Easley MD     Reason for encounter: a fib RVR     HPI:       Darlyn Calderon is a 82 y.o. 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 For 2 days at home she had been having copious amts of diarrhea, not eating or drinking much and continued to taker her BP meds. She denies any chest pain, palpitations, coughing, wheezing or dyspnea. She has no fever, chills or night sweats. She has no recent weight gain or lower extremity edema. She has no dizziness or syncopal episodes. No previous a fib hx     ED finds a fib RVR/JT/hypomagnesemia: pt was given IV diltiazem 10 mg times 2, mag 2 grams and fluid bolus. Subjective:      Darlyn Calderon reports she wants to go home. Assessment and Plan     1 Hx HTN now with hypotension: likely precipitated by dehydration/recent surgery. Hold home BP meds. 2 a fib RVR: unable to tolerate BB due to low BP. Replete K, start oral amiodarone 400 mg BID. CHADs 2 Vasc: 5, on lower dose eliquis. ECHO  pending   3 recent endovascular AAA repair: stable   4 JT: improving,  avoid nephrotoxic agents. Cont IV fluids.                ____________________________________________________________    Cardiac testing    ECHO pending             Most recent HS troponins:  Recent Labs     03/08/23  0006 03/06/23  2146 03/06/23  1811   TROPHS 122* 269* 345*       EKG Results       Procedure 720 Value Units Date/Time    EKG, 12 LEAD, INITIAL [[id number]] Collected: 03/06/23 1827    Order Status: Completed Updated: 03/07/23 1053 Ventricular Rate 117 BPM      Atrial Rate 108 BPM      QRS Duration 70 ms      Q-T Interval 290 ms      QTC Calculation (Bezet) 404 ms      Calculated R Axis 3 degrees      Calculated T Axis -13 degrees      Diagnosis --     Atrial fibrillation with rapid ventricular response with premature   ventricular or aberrantly conducted complexes  Nonspecific ST abnormality  Abnormal ECG  When compared with ECG of 22-FEB-2023 15:48,  Atrial fibrillation has replaced Sinus rhythm  Confirmed by [id number] MD, Melvin Beck ([id number]) on 3/7/2023 10:54:32 AM                Review of Systems:    [x]All other systems reviewed and all negative except as written in HPI    [] Patient unable to provide secondary to condition    Past Medical History:   Diagnosis Date    Abdominal aneurysm 2023    Arthritis     Chronic obstructive pulmonary disease ([facility] [address]     Hyperlipidemia     Hypertension      Past Surgical History:   Procedure Laterality Date    COLONOSCOPY N/A 12/13/2017    COLONOSCOPY, EGD performed by Maraima Rogers MD at [address] HEENT Bilateral 01/26/2023    cateract surgery    HX ORTHOPAEDIC Left 2013    ORIF left wrist     Social Hx:  reports that she quit smoking about 7 years ago. Her smoking use included cigarettes. She has a 35.00 pack-year smoking history. She has never used smokeless tobacco. She reports that she does not drink alcohol and does not use drugs. Family Hx: family history includes Cancer in her brother.   Allergies   Allergen Reactions    Tetracycline Other (comments)     Mouth soreness          OBJECTIVE:  Wt Readings from Last 3 Encounters:   03/08/23 73.9 kg (162 lb 14.7 oz)   03/03/23 72.8 kg (160 lb 7.9 oz)   02/22/23 69.4 kg (153 lb)       Intake/Output Summary (Last 24 hours) at 3/8/2023 0757  Last data filed at 3/8/2023 0641  Gross per 24 hour   Intake 100 ml   Output 300 ml   Net -200 ml         Physical Exam:    Vitals:   Vitals:    03/07/23 2329 03/08/23 0240 03/08/23 0449 03/08/23 0721   BP: (!) 109/52 (!) 91/50 113/64 113/64   Pulse: (!) 112 (!) 108 (!) 102    Resp: 19 17     Temp: 98 °F (36.7 °C) 97.9 °F (36.6 °C)     SpO2: 93% 90% 91%    Weight:  73.9 kg (163 lb)  73.9 kg (162 lb 14.7 oz)   Height:    5' 1\" (1.549 m)     Telemetry: a fib     Gen: Well-developed, well-nourished, in no acute distress  Neck: Supple, No JVD, No Carotid Bruit  Resp: No accessory muscle use, Clear breath sounds, No rales or rhonchi  Card: irregular Rate and  Rhythm, Normal S1, S2, No murmurs, rubs or gallop. Abd:   Soft, non-tender, non-distended, BS+   MSK: No cyanosis  Skin: No rashes    Neuro: Moving all four extremities, follows commands appropriately  Psych: Good insight, oriented to person, place, alert, Nml Affect  LE: No edema    Data Review:     Radiology:   XR Results (most recent):  Results from Hospital Encounter encounter on 03/06/23    XR CHEST PORT    Narrative  EXAM:  XR CHEST PORT    INDICATION: Evaluation for infiltrate    COMPARISON: 2.22.2023    TECHNIQUE: portable chest AP view and portably at 1832 hours    FINDINGS: The cardiac silhouette is stable. The pulmonary vasculature is within  normal limits. There are several metallic fragments overlying the left upper  chest wall. Small hiatal hernia is present. The lungs and pleural spaces are hyperinflated but clear. The visualized bones  and upper abdomen are age-appropriate. Impression  Lung hyperinflation. No infiltrate. Incidental hiatal hernia      Recent Labs     03/08/23  0006 03/07/23  1030    135*   K 3.0* 3.5    105   CO2 24 23   BUN 37* 45*   CREA 1.02 1.20*   * 113*   PHOS 2.5*  --    CA 8.2* 8.4*       Recent Labs     03/08/23  0006 03/06/23  1810   WBC 8.6 9.2   HGB 9.6* 10.8*   HCT 29.1* 33.0*    213       Recent Labs     03/06/23  1810   AP 77       No results for input(s): CHOL, LDLC in the last 72 hours.     No lab exists for component: TGL, HDLC,  HBA1C      Current meds:    Current Facility-Administered Medications:     potassium chloride SR (KLOR-CON 10) tablet 20 mEq, 20 mEq, Oral, Q4H, Elida Kulkarni NP    amiodarone (CORDARONE) tablet 400 mg, 400 mg, Oral, BID, Elida Kulkarni NP    albuterol-ipratropium (DUO-NEB) 2.5 MG-0.5 MG/3 ML, 3 mL, Nebulization, Q6H PRN, Seth Salas MD    cefTRIAXone (ROCEPHIN) 1 g in 0.9% sodium chloride 10 mL IV syringe, 1 g, IntraVENous, Q24H, Seth Salas MD, 1 g at 03/07/23 0802    0.9% sodium chloride infusion, 100 mL/hr, IntraVENous, CONTINUOUS, David Singh MD, Last Rate: 100 mL/hr at 03/08/23 0644, 100 mL/hr at 03/08/23 0644    tiotropium-olodateroL (STIOLTO RESPIMAT) 2.5-2.5 mcg/actuation inhaler 2 Puff (Patient Supplied), 2 Puff, Inhalation, DAILY, Seth Salas MD, 2 Puff at 03/07/23 2132    sodium chloride (NS) flush 5-10 mL, 5-10 mL, IntraVENous, PRN, Azul MaskerDarlyn MD    [Held by provider] amLODIPine (NORVASC) tablet 5 mg, 5 mg, Oral, Rosi NATH Jeani Helper, MD    aspirin chewable tablet 81 mg, 81 mg, Oral, DAILY, Akbar Aranda MD, 81 mg at 03/07/23 0814    [Held by provider] lisinopriL (PRINIVIL, ZESTRIL) tablet 20 mg, 20 mg, Oral, DAILY, Karl Markham MD    [Held by provider] hydroCHLOROthiazide (HYDRODIURIL) tablet 25 mg, 25 mg, Oral, Rosi NATH Jeani Helper, MD    pravastatin (PRAVACHOL) tablet 40 mg, 40 mg, Oral, QHS, Karl Markham MD, 40 mg at 03/07/23 2120    sodium chloride (NS) flush 5-40 mL, 5-40 mL, IntraVENous, Q8H, Akbar Aranda MD, 10 mL at 03/08/23 0643    sodium chloride (NS) flush 5-40 mL, 5-40 mL, IntraVENous, PRN, Karl Markham MD    acetaminophen (TYLENOL) tablet 650 mg, 650 mg, Oral, Q6H PRN, 650 mg at 03/07/23 2120 **OR** acetaminophen (TYLENOL) suppository 650 mg, 650 mg, Rectal, Q6H PRN, Karl Markham MD    polyethylene glycol (MIRALAX) packet 17 g, 17 g, Oral, DAILY PRN, Karl Markham MD    ondansetron (ZOFRAN ODT) tablet 4 mg, 4 mg, Oral, Q8H PRN **OR** ondansetron (ZOFRAN) injection 4 mg, 4 mg, IntraVENous, Q6H PRN, Karl Markham MD    [Held by provider] metoprolol succinate (TOPROL-XL) XL tablet 25 mg, 25 mg, Oral, DAILY, Ishaan Aranda MD, 25 mg at 03/06/23 2020    apixaban (ELIQUIS) tablet 2.5 mg, 2.5 mg, Oral, BID, Yanira Foreman MD, 2.5 mg at 03/07/23 [address] [place] [address],     [facility]  Call center: (W) [phone]  (F) [phone]      Becca Motta MD

## 2023-03-08 NOTE — PROGRESS NOTES
Physician Progress Note      [facility]  CSN #:                  [id number]  :                       1935  ADMIT DATE:       3/6/2023 5:45 PM  [address] [place] DATE:  RESPONDING  PROVIDER #:        Nicol Jj DO, MD          QUERY TEXT:    Good morning. Patient admitted with weakness and hypotension, noted to have new onset atrial fibrillation with RVR and was started on Eliquis. If possible, please document in progress notes and discharge summary if you are evaluating and/or treating any of the following: The medical record reflects the following:      Risk Factors: recent AAA repair, HTN, COPD    Clinical Indicators: EKG: Atrial fibrillation with rapid ventricular response with premature ventricular or aberrantly conducted complexes Nonspecific ST abnormality;  Cardiology consult: \"Atrial fibrillation with RVR-new onset\" and \"-Rate control with beta-blockers. 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     Query created by: Chani Estrella on 3/8/2023 7:50 AM      Electronically signed by:  Harrison Camejo MD 3/8/2023 7:53 AM

## 2023-03-08 NOTE — PROGRESS NOTES
0930: Asked Dr. Benito Newell about pt taking ASA with eliquis. Orders to discontinue ASA. 1130: Called pharmacy because potassium phosphate ordered at 65ml/hr, pump is giving gaurdrails warning that rate exceeds recommended. Pharmacist reported he will correct. 1300: Pt unable to tolerate potassium phosphate. States it is burning. IV access appears to be intact, tried with both IVs. Notified Dr. Benito Newell.    1500: Bedside shift change report given to Adelaida (oncoming nurse) by Nic Stanley (offgoing nurse). Report included the following information SBAR, recetn results, cardiac rhythm a-fib.

## 2023-03-09 ENCOUNTER — HOSPITAL ENCOUNTER (INPATIENT)
Dept: NON INVASIVE DIAGNOSTICS | Age: 88
Discharge: HOME | End: 2023-03-09
Attending: NURSE PRACTITIONER
Payer: MEDICARE

## 2023-03-09 VITALS
HEIGHT: 61 IN | RESPIRATION RATE: 19 BRPM | SYSTOLIC BLOOD PRESSURE: 124 MMHG | DIASTOLIC BLOOD PRESSURE: 60 MMHG | WEIGHT: 162.92 LBS | HEART RATE: 83 BPM | OXYGEN SATURATION: 90 % | BODY MASS INDEX: 30.76 KG/M2

## 2023-03-09 LAB
ANION GAP SERPL CALC-SCNC: 6 MMOL/L (ref 5–15)
BACTERIA SPEC CULT: ABNORMAL
BUN SERPL-MCNC: 24 MG/DL (ref 6–20)
BUN/CREAT SERPL: 28 (ref 12–20)
CALCIUM SERPL-MCNC: 8.5 MG/DL (ref 8.5–10.1)
CC UR VC: ABNORMAL
CHLORIDE SERPL-SCNC: 107 MMOL/L (ref 97–108)
CO2 SERPL-SCNC: 25 MMOL/L (ref 21–32)
CREAT SERPL-MCNC: 0.85 MG/DL (ref 0.55–1.02)
GLUCOSE SERPL-MCNC: 106 MG/DL (ref 65–100)
MAGNESIUM SERPL-MCNC: 1.6 MG/DL (ref 1.6–2.4)
PHOSPHATE SERPL-MCNC: 2.8 MG/DL (ref 2.6–4.7)
POTASSIUM SERPL-SCNC: 3.2 MMOL/L (ref 3.5–5.1)
SERVICE CMNT-IMP: ABNORMAL
SODIUM SERPL-SCNC: 138 MMOL/L (ref 136–145)

## 2023-03-09 PROCEDURE — 74011250636 HC RX REV CODE- 250/636: Performed by: NURSE PRACTITIONER

## 2023-03-09 PROCEDURE — 65270000029 HC RM PRIVATE

## 2023-03-09 PROCEDURE — 94761 N-INVAS EAR/PLS OXIMETRY MLT: CPT

## 2023-03-09 PROCEDURE — 93312 ECHO TRANSESOPHAGEAL: CPT

## 2023-03-09 PROCEDURE — 84100 ASSAY OF PHOSPHORUS: CPT

## 2023-03-09 PROCEDURE — 92960 CARDIOVERSION ELECTRIC EXT: CPT

## 2023-03-09 PROCEDURE — 92960 CARDIOVERSION ELECTRIC EXT: CPT | Performed by: INTERNAL MEDICINE

## 2023-03-09 PROCEDURE — APPSS30 APP SPLIT SHARED TIME 16-30 MINUTES: Performed by: NURSE PRACTITIONER

## 2023-03-09 PROCEDURE — 74011250636 HC RX REV CODE- 250/636: Performed by: INTERNAL MEDICINE

## 2023-03-09 PROCEDURE — 36415 COLL VENOUS BLD VENIPUNCTURE: CPT

## 2023-03-09 PROCEDURE — 74011000250 HC RX REV CODE- 250: Performed by: INTERNAL MEDICINE

## 2023-03-09 PROCEDURE — 93325 DOPPLER ECHO COLOR FLOW MAPG: CPT | Performed by: INTERNAL MEDICINE

## 2023-03-09 PROCEDURE — 74011250637 HC RX REV CODE- 250/637: Performed by: NURSE PRACTITIONER

## 2023-03-09 PROCEDURE — 93320 DOPPLER ECHO COMPLETE: CPT | Performed by: INTERNAL MEDICINE

## 2023-03-09 PROCEDURE — 83735 ASSAY OF MAGNESIUM: CPT

## 2023-03-09 PROCEDURE — 94640 AIRWAY INHALATION TREATMENT: CPT

## 2023-03-09 PROCEDURE — 99152 MOD SED SAME PHYS/QHP 5/>YRS: CPT

## 2023-03-09 PROCEDURE — 5A2204Z RESTORATION OF CARDIAC RHYTHM, SINGLE: ICD-10-PCS | Performed by: INTERNAL MEDICINE

## 2023-03-09 PROCEDURE — 74011250637 HC RX REV CODE- 250/637: Performed by: INTERNAL MEDICINE

## 2023-03-09 PROCEDURE — 93312 ECHO TRANSESOPHAGEAL: CPT | Performed by: INTERNAL MEDICINE

## 2023-03-09 PROCEDURE — 80048 BASIC METABOLIC PNL TOTAL CA: CPT

## 2023-03-09 PROCEDURE — 99232 SBSQ HOSP IP/OBS MODERATE 35: CPT | Performed by: NURSE PRACTITIONER

## 2023-03-09 RX ORDER — MIDAZOLAM HYDROCHLORIDE 1 MG/ML
.5-1 INJECTION, SOLUTION INTRAMUSCULAR; INTRAVENOUS
Status: DISCONTINUED | OUTPATIENT
Start: 2023-03-09 | End: 2023-03-13 | Stop reason: HOSPADM

## 2023-03-09 RX ORDER — SODIUM CHLORIDE 9 MG/ML
10 INJECTION INTRAMUSCULAR; INTRAVENOUS; SUBCUTANEOUS
Status: DISCONTINUED | OUTPATIENT
Start: 2023-03-09 | End: 2023-03-13 | Stop reason: HOSPADM

## 2023-03-09 RX ORDER — MAGNESIUM SULFATE HEPTAHYDRATE 40 MG/ML
2 INJECTION, SOLUTION INTRAVENOUS ONCE
Status: COMPLETED | OUTPATIENT
Start: 2023-03-09 | End: 2023-03-09

## 2023-03-09 RX ORDER — LIDOCAINE HYDROCHLORIDE 20 MG/ML
15 SOLUTION OROPHARYNGEAL
Status: DISCONTINUED | OUTPATIENT
Start: 2023-03-09 | End: 2023-03-13 | Stop reason: HOSPADM

## 2023-03-09 RX ORDER — LIDOCAINE 50 MG/G
OINTMENT TOPICAL
Status: DISCONTINUED | OUTPATIENT
Start: 2023-03-09 | End: 2023-03-13 | Stop reason: HOSPADM

## 2023-03-09 RX ORDER — POTASSIUM CHLORIDE 7.45 MG/ML
10 INJECTION INTRAVENOUS
Status: COMPLETED | OUTPATIENT
Start: 2023-03-09 | End: 2023-03-09

## 2023-03-09 RX ORDER — FENTANYL CITRATE 50 UG/ML
25-200 INJECTION, SOLUTION INTRAMUSCULAR; INTRAVENOUS
Status: DISCONTINUED | OUTPATIENT
Start: 2023-03-09 | End: 2023-03-13 | Stop reason: HOSPADM

## 2023-03-09 RX ADMIN — MAGNESIUM SULFATE HEPTAHYDRATE 2 G: 40 INJECTION, SOLUTION INTRAVENOUS at 08:29

## 2023-03-09 RX ADMIN — CEFTRIAXONE 1 G: 1 INJECTION, POWDER, FOR SOLUTION INTRAMUSCULAR; INTRAVENOUS at 08:28

## 2023-03-09 RX ADMIN — POTASSIUM CHLORIDE 10 MEQ: 7.46 INJECTION, SOLUTION INTRAVENOUS at 08:29

## 2023-03-09 RX ADMIN — APIXABAN 2.5 MG: 2.5 TABLET, FILM COATED ORAL at 21:23

## 2023-03-09 RX ADMIN — APIXABAN 2.5 MG: 2.5 TABLET, FILM COATED ORAL at 09:52

## 2023-03-09 RX ADMIN — POTASSIUM CHLORIDE 10 MEQ: 7.46 INJECTION, SOLUTION INTRAVENOUS at 12:25

## 2023-03-09 RX ADMIN — SODIUM CHLORIDE, PRESERVATIVE FREE 10 ML: 5 INJECTION INTRAVENOUS at 21:23

## 2023-03-09 RX ADMIN — LIDOCAINE HYDROCHLORIDE 15 ML: 20 SOLUTION ORAL; TOPICAL at 13:43

## 2023-03-09 RX ADMIN — AMIODARONE HYDROCHLORIDE 400 MG: 200 TABLET ORAL at 09:51

## 2023-03-09 RX ADMIN — SODIUM CHLORIDE, PRESERVATIVE FREE 10 ML: 5 INJECTION INTRAVENOUS at 17:39

## 2023-03-09 RX ADMIN — Medication: at 13:48

## 2023-03-09 RX ADMIN — FENTANYL CITRATE 25 MCG: 50 INJECTION, SOLUTION INTRAMUSCULAR; INTRAVENOUS at 14:18

## 2023-03-09 RX ADMIN — AMIODARONE HYDROCHLORIDE 400 MG: 200 TABLET ORAL at 17:38

## 2023-03-09 RX ADMIN — MIDAZOLAM 1 MG: 1 INJECTION INTRAMUSCULAR; INTRAVENOUS at 14:18

## 2023-03-09 RX ADMIN — METOPROLOL SUCCINATE 25 MG: 25 TABLET, EXTENDED RELEASE ORAL at 08:28

## 2023-03-09 RX ADMIN — ACETAMINOPHEN 650 MG: 325 TABLET ORAL at 17:38

## 2023-03-09 RX ADMIN — POTASSIUM CHLORIDE 10 MEQ: 7.46 INJECTION, SOLUTION INTRAVENOUS at 10:39

## 2023-03-09 RX ADMIN — PRAVASTATIN SODIUM 40 MG: 20 TABLET ORAL at 21:22

## 2023-03-09 RX ADMIN — MIDAZOLAM 1 MG: 1 INJECTION INTRAMUSCULAR; INTRAVENOUS at 14:20

## 2023-03-09 RX ADMIN — BENZOCAINE: 200 SPRAY DENTAL; ORAL; PERIODONTAL at 14:16

## 2023-03-09 NOTE — PROGRESS NOTES
0700 Bedside and Verbal shift change report given to 500 Mandy Kurtz (oncoming nurse) by Junius Spurling (offgoing nurse). Report included the following information SBAR, Kardex, ED Summary, Intake/Output, MAR, Recent Results, and Cardiac Rhythm A fib . This patient was assisted with Intentional Toileting every 2 hours during this shift as appropriate. Documentation of ambulation and output reflected on Flowsheet as appropriate. Purposeful hourly rounding was completed using AIDET and 5Ps. Outcomes of PHR documented as they occurred. Bed alarm in use as appropriate. Dual Suction and ambubag in place. 1300 Pt off floor to scheduled cardioversion. 1448 Report received from Taiwo 1579, Pt in NSR following cardioversion, Ok to assess gag reflex after [address] to reorder regular diet per Dr. Edward Hicks. Orders placed. Will continue to monitor. 1930 Bedside and Verbal shift change report given to [facility] (oncoming nurse) by She Bone RN (offgoing nurse). Report included the following information SBAR, Kardex, ED Summary, Intake/Output, MAR, Recent Results, and Cardiac Rhythm NSR .

## 2023-03-09 NOTE — PROGRESS NOTES
TRANSFER - OUT REPORT:    Verbal report given to [facility], RN(name) on Abeba Manzo being transferred to [facility](unit) for routine post - op       Report consisted of patient's Situation, Background, Assessment and   Recommendations(SBAR). Information from the following report(s) Procedure Summary was reviewed with the receiving nurse. Opportunity for questions and clarification was provided.       Patient transported with:   Monitor  Tech

## 2023-03-09 NOTE — PROGRESS NOTES
[address] [facility]                    Cardiology Care Note     []Initial Encounter     [x]Follow-up    Patient Name: Jayro Powell - QBV:55/64/[id number] - HUU:[id number]  Primary Cardiologist: none   Consulting Cardiologist: [employer] [facility]: Byron Prado MD     Reason for encounter: a fib RVR     HPI:       Jayro Powell is a 82 y.o. 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 For 2 days at home she had been having copious amts of diarrhea, not eating or drinking much and continued to taker her BP meds. She denies any chest pain, palpitations, coughing, wheezing or dyspnea. She has no fever, chills or night sweats. She has no recent weight gain or lower extremity edema. She has no dizziness or syncopal episodes. No previous a fib hx     ED finds a fib RVR/JT/hypomagnesemia: pt was given IV diltiazem 10 mg times 2, mag 2 grams and fluid bolus. Subjective:      Jayro Powell reports she is still having looser stools. Assessment and Plan     1 Hx HTN with hypotension on admission BP better, resume BB, stop norvasc. : likely precipitated by dehydration/recent surgery. 2 a fib RVR: cont amiodarone load. Replete K, Mg++,  CHADs 2 Vasc: 5, on lower dose eliquis. For SELVIN /DCCV this am. Pt agrees to proceed, procedure explained. pending   3 recent endovascular AAA repair: stable   4 JT: resolved, avoid nephrotoxic agents. ____________________________________________________________    Cardiac testing    ECHO ADULT COMPLETE 03/08/2023 3/8/2023    Interpretation Summary    Left Ventricle: Normal left ventricular systolic function with a visually estimated EF of 60 - 65%. Left ventricle size is normal. Normal wall thickness. Normal wall motion.     Aortic Valve: Tricuspid valve.    Tricuspid Valve: Mildly elevated RVSP. Left Atrium: Left atrium is dilated. Right Atrium: Right atrium is dilated. Rhythm, atrial fibrillation    Signed by: Mu Mendoza DO on 3/8/2023 12:47 PM                Most recent HS troponins:  Recent Labs     03/08/23  0006 03/06/23  2146 03/06/23  1811   TROPHS 122* 269* 345*       EKG Results       Procedure 720 Value Units Date/Time    EKG, 12 LEAD, INITIAL [[id number]] Collected: 03/06/23 1827    Order Status: Completed Updated: 03/07/23 1054     Ventricular Rate 117 BPM      Atrial Rate 108 BPM      QRS Duration 70 ms      Q-T Interval 290 ms      QTC Calculation (Bezet) 404 ms      Calculated R Axis 3 degrees      Calculated T Axis -13 degrees      Diagnosis --     Atrial fibrillation with rapid ventricular response with premature   ventricular or aberrantly conducted complexes  Nonspecific ST abnormality  Abnormal ECG  When compared with ECG of 22-FEB-2023 15:48,  Atrial fibrillation has replaced Sinus rhythm  Confirmed by [id number], Lui SALMON, Manfred ([id number]) on 3/7/2023 10:54:32 AM                Review of Systems:    [x]All other systems reviewed and all negative except as written in HPI    [] Patient unable to provide secondary to condition    Past Medical History:   Diagnosis Date    Abdominal aneurysm 2023    Arthritis     Chronic obstructive pulmonary disease ([facility] [address]     Hyperlipidemia     Hypertension      Past Surgical History:   Procedure Laterality Date    COLONOSCOPY N/A 12/13/2017    COLONOSCOPY, EGD performed by Pam Arias MD at [address] [place] [address] Drive,[address] HEENT Bilateral 01/26/2023    cateract surgery    HX ORTHOPAEDIC Left 2013    ORIF left wrist     Social Hx:  reports that she quit smoking about 7 years ago. Her smoking use included cigarettes. She has a 35.00 pack-year smoking history. She has never used smokeless tobacco. She reports that she does not drink alcohol and does not use drugs.   Family Hx: family history includes Cancer in her brother. Allergies   Allergen Reactions    Tetracycline Other (comments)     Mouth soreness          OBJECTIVE:  Wt Readings from Last 3 Encounters:   03/08/23 73.9 kg (162 lb 14.7 oz)   03/03/23 72.8 kg (160 lb 7.9 oz)   02/22/23 69.4 kg (153 lb)       Intake/Output Summary (Last 24 hours) at 3/9/2023 0734  Last data filed at 3/8/2023 1536  Gross per 24 hour   Intake 221 ml   Output 400 ml   Net -179 ml         Physical Exam:    Vitals:   Vitals:    03/08/23 2256 03/08/23 2320 03/09/23 0403 03/09/23 0720   BP:  (!) 92/53 136/74    Pulse: (!) 110 (!) 106 (!) 104    Resp:  16     Temp:  98.2 °F (36.8 °C) 98 °F (36.7 °C)    SpO2:  93% 92% 93%   Weight:       Height:         Telemetry: a fib 104    Gen: Well-developed, well-nourished, in no acute distress  Neck: Supple, No JVD, No Carotid Bruit  Resp: No accessory muscle use, Clear breath sounds, No rales or rhonchi  Card: irregular Rate and  Rhythm, Normal S1, S2, No murmurs, rubs or gallop. Abd:   Soft, non-tender, non-distended, BS+   MSK: No cyanosis  Skin: No rashes    Neuro: Moving all four extremities, follows commands appropriately  Psych: Good insight, oriented to person, place, alert, Nml Affect  LE: No edema    Data Review:     Radiology:   XR Results (most recent):  Results from Hospital Encounter encounter on 03/06/23    XR CHEST PORT    Narrative  EXAM:  XR CHEST PORT    INDICATION: Evaluation for infiltrate    COMPARISON: 2.22.2023    TECHNIQUE: portable chest AP view and portably at 1832 hours    FINDINGS: The cardiac silhouette is stable. The pulmonary vasculature is within  normal limits. There are several metallic fragments overlying the left upper  chest wall. Small hiatal hernia is present. The lungs and pleural spaces are hyperinflated but clear. The visualized bones  and upper abdomen are age-appropriate. Impression  Lung hyperinflation. No infiltrate.  Incidental hiatal hernia      Recent Labs     03/09/23  0017 03/08/23  0006    136   K 3.2* 3.0*    107   CO2 25 24   BUN 24* 37*   CREA 0.85 1.02   * 108*   PHOS 2.8 2.5*   CA 8.5 8.2*       Recent Labs     03/08/23  0006 03/06/23  1810   WBC 8.6 9.2   HGB 9.6* 10.8*   HCT 29.1* 33.0*    213       Recent Labs     03/06/23  1810   AP 77       No results for input(s): CHOL, LDLC in the last 72 hours.     No lab exists for component: TGL, HDLC,  HBA1C      Current meds:    Current Facility-Administered Medications:     amiodarone (CORDARONE) tablet 400 mg, 400 mg, Oral, BID, Elida Kulkarni NP, 400 mg at 03/08/23 1743    potassium, sodium phosphates (NEUTRA-PHOS) packet 2 Packet, 2 Packet, Oral, BID, Seth Salas MD, 2 Packet at 03/08/23 1743    albuterol-ipratropium (DUO-NEB) 2.5 MG-0.5 MG/3 ML, 3 mL, Nebulization, Q6H PRN, Seth Salas MD    cefTRIAXone (ROCEPHIN) 1 g in 0.9% sodium chloride 10 mL IV syringe, 1 g, IntraVENous, Q24H, Ko Salas MD, 1 g at 03/08/23 0920    tiotropium-olodateroL (STIOLTO RESPIMAT) 2.5-2.5 mcg/actuation inhaler 2 Puff (Patient Supplied), 2 Puff, Inhalation, DAILY, Ko Salas MD, 2 Puff at 03/09/23 0720    sodium chloride (NS) flush 5-10 mL, 5-10 mL, IntraVENous, PRN, Darlyn Ruiz MD    amLODIPine (NORVASC) tablet 5 mg, 5 mg, Oral, Sindi NATH MD    [Held by provider] lisinopriL (PRINIVIL, ZESTRIL) tablet 20 mg, 20 mg, Oral, DAILY, Sindi Richter MD    [Held by provider] hydroCHLOROthiazide (HYDRODIURIL) tablet 25 mg, 25 mg, Oral, Sindi NATH, MD    pravastatin (PRAVACHOL) tablet 40 mg, 40 mg, Oral, QHS, Sindi Richter MD, 40 mg at 03/08/23 2247    sodium chloride (NS) flush 5-40 mL, 5-40 mL, IntraVENous, Q8H, Sindi Richter MD, 10 mL at 03/08/23 2247    sodium chloride (NS) flush 5-40 mL, 5-40 mL, IntraVENous, PRN, Sindi Richter MD    acetaminophen (TYLENOL) tablet 650 mg, 650 mg, Oral, Q6H PRN, 650 mg at 03/08/23 2305 **OR** acetaminophen (TYLENOL) suppository 650 mg, 650 mg, Rectal, Q6H PRN, Sindi Richter MD    polyethylene glycol (MIRALAX) packet 17 g, 17 g, Oral, DAILY PRN, Mary Lopez MD    ondansetron (ZOFRAN ODT) tablet 4 mg, 4 mg, Oral, Q8H PRN **OR** ondansetron (ZOFRAN) injection 4 mg, 4 mg, IntraVENous, Q6H PRN, Mary Lopez MD    [Held by provider] metoprolol succinate (TOPROL-XL) XL tablet 25 mg, 25 mg, Oral, DAILY, Mauricio Aranda MD, 25 mg at 03/06/23 2020    apixaban (ELIQUIS) tablet 2.5 mg, 2.5 mg, Oral, BID, Mary Lopez MD, 2.5 mg at 03/08/23 [address], NP    [facility] Cardiology  Call center: (R) [phone]  (F) [phone]      Sadaf Florian MD

## 2023-03-09 NOTE — PROGRESS NOTES
[facility]  [address] [facility], [address] [place] [address]  [phone]      Hospitalist Progress Note      NAME: Nany Levine   :  1935  MRM:  [id number]    Date/Time of service: 3/9/2023  2:08 AM       Subjective:     Chief Complaint:  Patient was personally seen and examined by me during this time period. Chart reviewed. Not rate controlled. Awaiting cardioversion        Objective:       Vitals:       Last 24hrs VS reviewed since prior progress note.  Most recent are:    Visit Vitals  /70 (BP 1 Location: Right upper arm, BP Patient Position: At rest)   Pulse (!) 106   Temp 98 °F (36.7 °C)   Resp 16   Ht 5' 1\" (1.549 m)   Wt 73.9 kg (162 lb 14.7 oz)   SpO2 93%   BMI 30.78 kg/m²     SpO2 Readings from Last 6 Encounters:   23 93%   23 92%   23 95%   23 96%   20 94%   17 98%          Intake/Output Summary (Last 24 hours) at 3/9/2023 1408  Last data filed at 3/9/2023 1235  Gross per 24 hour   Intake 25 ml   Output 100 ml   Net -75 ml          Exam:     Physical Exam:    Gen:  Well-developed, well-nourished, obese, in no acute distress  HEENT:  Pink conjunctivae, PERRL, hearing intact to voice, moist mucous membranes  Neck:  Supple, without masses, thyroid non-tender  Resp:  No accessory muscle use, clear breath sounds without wheezes rales or rhonchi  Card:  No murmurs, irregular, normal S1, S2 without thrills, bruits or peripheral edema  Abd:  Soft, non-tender, non-distended, normoactive bowel sounds are present, surgical wound c/d/i  Musc:  No cyanosis or clubbing  Skin:  No rashes or  Neuro:  Cranial nerves 3-12 are grossly intact, follows commands appropriately  Psych:  Good insight, oriented to person, place and time, alert    Medications Reviewed: (see below)    Lab Data Reviewed: (see below)    ______________________________________________________________________    Medications:     Current Facility-Administered Medications Medication Dose Route Frequency    amiodarone (CORDARONE) tablet 400 mg  400 mg Oral BID    potassium, sodium phosphates (NEUTRA-PHOS) packet 2 Packet  2 Packet Oral BID    albuterol-ipratropium (DUO-NEB) 2.5 MG-0.5 MG/3 ML  3 mL Nebulization Q6H PRN    cefTRIAXone (ROCEPHIN) 1 g in 0.9% sodium chloride 10 mL IV syringe  1 g IntraVENous Q24H    tiotropium-olodateroL (STIOLTO RESPIMAT) 2.5-2.5 mcg/actuation inhaler 2 Puff (Patient Supplied)  2 Puff Inhalation DAILY    sodium chloride (NS) flush 5-10 mL  5-10 mL IntraVENous PRN    [Held by provider] amLODIPine (NORVASC) tablet 5 mg  5 mg Oral QAM    [Held by provider] lisinopriL (PRINIVIL, ZESTRIL) tablet 20 mg  20 mg Oral DAILY    [Held by provider] hydroCHLOROthiazide (HYDRODIURIL) tablet 25 mg  25 mg Oral QAM    pravastatin (PRAVACHOL) tablet 40 mg  40 mg Oral QHS    sodium chloride (NS) flush 5-40 mL  5-40 mL IntraVENous Q8H    sodium chloride (NS) flush 5-40 mL  5-40 mL IntraVENous PRN    acetaminophen (TYLENOL) tablet 650 mg  650 mg Oral Q6H PRN    Or    acetaminophen (TYLENOL) suppository 650 mg  650 mg Rectal Q6H PRN    polyethylene glycol (MIRALAX) packet 17 g  17 g Oral DAILY PRN    ondansetron (ZOFRAN ODT) tablet 4 mg  4 mg Oral Q8H PRN    Or    ondansetron (ZOFRAN) injection 4 mg  4 mg IntraVENous Q6H PRN    metoprolol succinate (TOPROL-XL) XL tablet 25 mg  25 mg Oral DAILY    apixaban (ELIQUIS) tablet 2.5 mg  2.5 mg Oral BID     Facility-Administered Medications Ordered in Other Encounters   Medication Dose Route Frequency    benzocaine (HURRICANE) 20 % spray   Mucous Membrane Rad Multiple    0.9% NaCl bacteriostatic (NORMAL SALINE) 0.9 % injection 10 mL  10 mL IntraVENous Rad Multiple    lidocaine (XYLOCAINE) 5 % ointment   Topical Rad Multiple    lidocaine (XYLOCAINE) 2 % viscous solution 15 mL  15 mL Mouth/Throat RAD PRN    midazolam (VERSED) injection 0.5-10 mg  0.5-10 mg IntraVENous Rad Multiple    fentaNYL citrate (PF) injection  mcg   mcg IntraVENous Rad Multiple          Lab Review:     Recent Labs     03/08/23  0006 03/06/23  1810   WBC 8.6 9.2   HGB 9.6* 10.8*   HCT 29.1* 33.0*    213       Recent Labs     03/09/23  0017 03/08/23  0006 03/07/23  1030 03/06/23  2146 03/06/23  1810    136 135*  --  133*   K 3.2* 3.0* 3.5  --  3.8    107 105  --  100   CO2 25 24 23  --  26   * 108* 113*  --  114*   BUN 24* 37* 45*  --  51*   CREA 0.85 1.02 1.20*  --  1.89*   CA 8.5 8.2* 8.4*  --  8.8   MG 1.6 2.0  --  1.4*  --    PHOS 2.8 2.5*  --   --   --    ALB  --   --   --   --  2.2*   TBILI  --   --   --   --  0.6   ALT  --   --   --   --  20       Lab Results   Component Value Date/Time    Glucose (POC) 104 (H) 03/06/2023 06:57 PM    Glucose (POC) 130 (H) 03/03/2023 07:31 AM          Assessment / Plan:     83 yo hx of HTN, COPD, recent AAA repair, presented w/ weakness, hypotension, UTI, JT, new afib RVR    1) New onset afib RVR: likely precipitated by recent AAA repair, dehydration. TSH wnl.  Echo w/ EF 60-65%. Cont Amio, eliquis. BP too low for BB. Cards following, plan for cardioversion 03/09    2) Hypotension: slowly improving. Likely from dehydration. Holding metoprolol, norvasc, ACEi, HCTZ. Cont IVF, monitor closely    3) JT: resolved. Likely pre-renal.  Cont IVF, monitor BMP. Renal was following    4) UTI: urine Cx w/ gram neg. Cont IV CTX    5) Recent AAA repair: Vasc following, no acute issues. Follow up with Dr. Rachelle Jeter    6) COPD: stable.   Use nebs prn    7) HypoK/phos: replete IV    **Prior records, notes, labs, radiology, and medications reviewed in [facility]**    Total time spent with patient care: 30 Minutes **I personally saw and examined the patient during this time period**                 Care Plan discussed with: Patient, nursing, family      Discussed:  Care Plan    Prophylaxis:   eliquis    Disposition:   PT, OT, RN           ___________________________________________________    Attending Physician: Alexandria Ahumada MD

## 2023-03-09 NOTE — ROUTINE PROCESS
Bedside shift change report given to [facility] (oncoming nurse) by Lay Leong (offgoing nurse). Report included the following information SBAR, Kardex, Intake/Output, MAR, Accordion, and Recent Results.

## 2023-03-09 NOTE — PROGRESS NOTES
Spiritual Care Partner Volunteer visited patient at [address] in [address] [place] [address] [address] on 3/9/2023  Documented by:  Marcela Lott 87, Trudy [address], [facility]  Staff [address]  Paging service: [phone] (АНДРЕЙ)

## 2023-03-10 VITALS
DIASTOLIC BLOOD PRESSURE: 64 MMHG | OXYGEN SATURATION: 92 % | SYSTOLIC BLOOD PRESSURE: 122 MMHG | TEMPERATURE: 98 F | BODY MASS INDEX: 29.34 KG/M2 | WEIGHT: 155.42 LBS | RESPIRATION RATE: 18 BRPM | HEART RATE: 72 BPM | HEIGHT: 61 IN

## 2023-03-10 LAB
ANION GAP SERPL CALC-SCNC: 7 MMOL/L (ref 5–15)
BUN SERPL-MCNC: 18 MG/DL (ref 6–20)
BUN/CREAT SERPL: 21 (ref 12–20)
CALCIUM SERPL-MCNC: 8.7 MG/DL (ref 8.5–10.1)
CHLORIDE SERPL-SCNC: 105 MMOL/L (ref 97–108)
CO2 SERPL-SCNC: 24 MMOL/L (ref 21–32)
CREAT SERPL-MCNC: 0.85 MG/DL (ref 0.55–1.02)
ERYTHROCYTE [DISTWIDTH] IN BLOOD BY AUTOMATED COUNT: 13.9 % (ref 11.5–14.5)
GLUCOSE SERPL-MCNC: 103 MG/DL (ref 65–100)
HCT VFR BLD AUTO: 29.6 % (ref 35–47)
HGB BLD-MCNC: 9.7 G/DL (ref 11.5–16)
MAGNESIUM SERPL-MCNC: 1.7 MG/DL (ref 1.6–2.4)
MCH RBC QN AUTO: 32.6 PG (ref 26–34)
MCHC RBC AUTO-ENTMCNC: 32.8 G/DL (ref 30–36.5)
MCV RBC AUTO: 99.3 FL (ref 80–99)
NRBC # BLD: 0.02 K/UL (ref 0–0.01)
NRBC BLD-RTO: 0.2 PER 100 WBC
PHOSPHATE SERPL-MCNC: 2.7 MG/DL (ref 2.6–4.7)
PLATELET # BLD AUTO: 357 K/UL (ref 150–400)
PMV BLD AUTO: 8.8 FL (ref 8.9–12.9)
POTASSIUM SERPL-SCNC: 3.6 MMOL/L (ref 3.5–5.1)
RBC # BLD AUTO: 2.98 M/UL (ref 3.8–5.2)
SODIUM SERPL-SCNC: 136 MMOL/L (ref 136–145)
WBC # BLD AUTO: 9.7 K/UL (ref 3.6–11)

## 2023-03-10 PROCEDURE — 74011250636 HC RX REV CODE- 250/636: Performed by: INTERNAL MEDICINE

## 2023-03-10 PROCEDURE — 74011000250 HC RX REV CODE- 250: Performed by: INTERNAL MEDICINE

## 2023-03-10 PROCEDURE — 83735 ASSAY OF MAGNESIUM: CPT

## 2023-03-10 PROCEDURE — 74011250637 HC RX REV CODE- 250/637: Performed by: NURSE PRACTITIONER

## 2023-03-10 PROCEDURE — 80048 BASIC METABOLIC PNL TOTAL CA: CPT

## 2023-03-10 PROCEDURE — 36415 COLL VENOUS BLD VENIPUNCTURE: CPT

## 2023-03-10 PROCEDURE — 84100 ASSAY OF PHOSPHORUS: CPT

## 2023-03-10 PROCEDURE — 85027 COMPLETE CBC AUTOMATED: CPT

## 2023-03-10 PROCEDURE — 74011250637 HC RX REV CODE- 250/637: Performed by: INTERNAL MEDICINE

## 2023-03-10 PROCEDURE — 94640 AIRWAY INHALATION TREATMENT: CPT

## 2023-03-10 RX ORDER — METOPROLOL SUCCINATE 50 MG/1
50 TABLET, EXTENDED RELEASE ORAL DAILY
Qty: 30 TABLET | Refills: 1 | Status: SHIPPED | OUTPATIENT
Start: 2023-03-10 | End: 2023-05-09

## 2023-03-10 RX ORDER — CEFUROXIME AXETIL 250 MG/1
250 TABLET ORAL 2 TIMES DAILY
Qty: 8 TABLET | Refills: 0 | Status: SHIPPED | OUTPATIENT
Start: 2023-03-10

## 2023-03-10 RX ORDER — AMIODARONE HYDROCHLORIDE 400 MG/1
400 TABLET ORAL 2 TIMES DAILY
Qty: 14 TABLET | Refills: 0 | Status: SHIPPED | OUTPATIENT
Start: 2023-03-10 | End: 2023-03-17

## 2023-03-10 RX ORDER — METOPROLOL SUCCINATE 50 MG/1
50 TABLET, EXTENDED RELEASE ORAL DAILY
Status: DISCONTINUED | OUTPATIENT
Start: 2023-03-10 | End: 2023-03-10 | Stop reason: HOSPADM

## 2023-03-10 RX ORDER — AMIODARONE HYDROCHLORIDE 200 MG/1
200 TABLET ORAL DAILY
Qty: 30 TABLET | Refills: 0 | Status: SHIPPED | OUTPATIENT
Start: 2023-03-18 | End: 2023-04-17

## 2023-03-10 RX ADMIN — AMIODARONE HYDROCHLORIDE 400 MG: 200 TABLET ORAL at 08:12

## 2023-03-10 RX ADMIN — APIXABAN 2.5 MG: 2.5 TABLET, FILM COATED ORAL at 08:12

## 2023-03-10 RX ADMIN — ACETAMINOPHEN 650 MG: 325 TABLET ORAL at 07:42

## 2023-03-10 RX ADMIN — METOPROLOL SUCCINATE 50 MG: 50 TABLET, EXTENDED RELEASE ORAL at 08:12

## 2023-03-10 RX ADMIN — CEFTRIAXONE 1 G: 1 INJECTION, POWDER, FOR SOLUTION INTRAMUSCULAR; INTRAVENOUS at 07:42

## 2023-03-10 NOTE — DISCHARGE SUMMARY
Hospitalist Discharge Summary     Patient ID:    Claude Urena  [id number]  14 y.o.  1935    Admit date of service: 3/6/2023    Discharge date of service: 3/10/2023    Admission Diagnoses: Atrial fibrillation with RVR ([facility] [I48.91]    Chronic Diagnoses:    Problem List as of 3/10/2023 Date Reviewed: 9/8/2014            Codes Class Noted - Resolved    Atrial fibrillation with RVR ([facility] ICD-10-CM: I48.91  ICD-9-CM: 427.31  3/6/2023 - Present        AA (aortic aneurysm) ([facility] ICD-10-CM: I71.9  ICD-9-CM: 441.9  3/2/2023 - Present        GI bleed ICD-10-CM: K92.2  ICD-9-CM: 578.9  9/8/2014 - Present        AAA (abdominal aortic aneurysm) ICD-10-CM: I71.40  ICD-9-CM: 441.4  9/8/2014 - Present        HTN (hypertension) ICD-10-CM: I10  ICD-9-CM: 401.9  9/8/2014 - Present        Hyperlipidemia ICD-10-CM: E78.5  ICD-9-CM: 272.4  9/8/2014 - Present        Abdominal pain ICD-10-CM: R10.9  ICD-9-CM: 789.00  9/8/2014 - Present        Fracture of radius, distal, left, closed ICD-10-CM: S52.502A  ICD-9-CM: 813.42  12/23/2011 - Present           Discharge Medications:   Current Discharge Medication List        START taking these medications    Details   !! amiodarone (PACERONE) 400 mg tablet Take 1 Tablet by mouth two (2) times a day for 7 days. Qty: 14 Tablet, Refills: 0      !! amiodarone (CORDARONE) 200 mg tablet Take 1 Tablet by mouth daily for 30 days. Qty: 30 Tablet, Refills: 0      apixaban (ELIQUIS) 2.5 mg tablet Take 1 Tablet by mouth two (2) times a day for 90 days. Qty: 60 Tablet, Refills: 2      metoprolol succinate (TOPROL-XL) 50 mg XL tablet Take 1 Tablet by mouth daily for 60 days. Qty: 30 Tablet, Refills: 1      cefUROXime (CEFTIN) 250 mg tablet Take 1 Tablet by mouth two (2) times a day. Qty: 8 Tablet, Refills: 0       !! - Potential duplicate medications found. Please discuss with provider.         CONTINUE these medications which have NOT CHANGED    Details   loratadine (CLARITIN) 10 mg tablet Take 10 mg by mouth Every morning. cholecalciferol, vitamin d3, 10 mcg (400 unit) cap Take 1 Tablet by mouth Every morning. tiotropium-olodateroL (Stiolto Respimat) 2.5-2.5 mcg/actuation inhaler Take 2 Puffs by inhalation Every morning. OTHER Administer 1 Drop to both eyes Every morning. Prednicort eye drops      fish oil-omega-3 fatty acids 340-1,000 mg capsule Take 2 Capsules by mouth daily. pravastatin (PRAVACHOL) 40 mg tablet Take 40 mg by mouth nightly. STOP taking these medications       HYDROcodone-acetaminophen (NORCO) 5-325 mg per tablet Comments:   Reason for Stopping:         aspirin 81 mg chewable tablet Comments:   Reason for Stopping:         amLODIPine (NORVASC) 5 mg tablet Comments:   Reason for Stopping:         benazepril (LOTENSIN) 20 mg tablet Comments:   Reason for Stopping:         hydrochlorothiazide (HYDRODIURIL) 25 mg tablet Comments:   Reason for Stopping: Follow up Care:    Shashank Harrington MD in 1-2 weeks  2. Cardiology     Diet:  Cardiac Diet    Disposition:  Home. Advanced Directive:    Discharge Exam:  See today's note. CONSULTATIONS: Cardiology    Significant Diagnostic Studies:   Recent Labs     03/10/23  0457 03/08/23  0006   WBC 9.7 8.6   HGB 9.7* 9.6*   HCT 29.6* 29.1*    227     Recent Labs     03/10/23  0457 03/09/23  0017 03/08/23  0006    138 136   K 3.6 3.2* 3.0*    107 107   CO2 24 25 24   BUN 18 24* 37*   CREA 0.85 0.85 1.02   * 106* 108*   CA 8.7 8.5 8.2*   MG 1.7 1.6 2.0   PHOS 2.7 2.8 2.5*     No results for input(s): ALT, AP, TBIL, TBILI, TP, ALB, GLOB, GGT, AML, LPSE in the last 72 hours. No lab exists for component: SGOT, GPT, AMYP, HLPSE  No results for input(s): INR, PTP, APTT, INREXT in the last 72 hours. No results for input(s): FE, TIBC, PSAT, FERR in the last 72 hours. No results for input(s): PH, PCO2, PO2 in the last 72 hours.   No results for input(s): CPK, CKMB in the last 72 hours.    No lab exists for component: TROPONINI  Lab Results   Component Value Date/Time    Glucose (POC) 104 (H) 03/06/2023 06:57 PM    Glucose (POC) 130 (H) 03/03/2023 07:31 AM             HOSPITAL COURSE & TREATMENT RENDERED:   New onset afib RVR: likely precipitated by recent AAA repair, dehydration. TSH wnl.  Echo w/ EF 60-65%. Cont Amio, eliquis, BB. evaluated by cardiology. S/p cardioversion 03/09. Now in sinus rhythm      2. Hypotension: slowly improving. cont to hold norvasc, ACEi, HCTZ. cont metoprolol. monitor closely     3. JT: resolved. Likely pre-renal.  Cont IVF, monitor BMP. Renal was following     4. UTI: urine Cx klebsiella. Cont IV CTX. Will switch to ceftin upon discharge      5. Recent AAA repair: Vasc following, no acute issues. Follow up with Dr. Pricilla Dubon     6. COPD: stable. Cont nebs prn     7. Diarrhea. Pt stated that it is resolving. Discharged in improved condition.     Spent 35 minutes    Signed:  Amy Gupta MD  3/10/2023  12:53 PM

## 2023-03-10 NOTE — PROGRESS NOTES
[facility] Hospitalist Discharge Summary    Admission Diagnosis:  Elective right total hip replacement by orthopedics for chronic osteoarthritis, pain of the right hip      Discharge Diagnosis:  -Elective right total hip replacement by orthopedics Dr. Garcia for chronic osteoarthritis, pain of the right hip: Tolerated surgery well.  Postop pain control with Tylenol, Vicodin.  Xarelto for DVT prophylaxis.  Physical therapy recommend discharge to [facility] for further rehabilitation.  Follow-up with orthopedics in 1 week.    Chronic hypertension: Stable.  Continue home medications.      Admission Dt/Tm     11/11/2020 10:08 AM  Discharge DT  November 16, 2020        Primary Care Physician  Glenn Diehl MD     Consultants:  Orthopedics [facility] Course   81-year-old male admitted for elective right total hip replacement for chronic pain and osteoarthritis of the right hip.  Tolerated surgery well, postop pain control with Tylenol, Vicodin.  Physical therapy evaluated patient recommend discharge to [facility] for further rehabilitation, recovery.  Follow-up with orthopedics in 1 week.        Discharge Medications     Summary of your Discharge Medications      Take these Medications      Details   * acetaminophen 325 MG tablet  Commonly known as: TYLENOL   Take 2 tablets by mouth every 4 hours as needed for Pain.     * acetaminophen 325 MG tablet  Commonly known as: TYLENOL   Take 3 tablets by mouth every 12 hours for 5 days.     amLODIPine 2.5 MG tablet  Commonly known as: NORVASC   Take 1 tablet by mouth daily.     atorvastatin 40 MG tablet  Commonly known as: LIPITOR   Take 1 tablet by mouth daily.     HYDROcodone-acetaminophen 5-325 MG per tablet  Commonly known as: NORCO   Take 1 tablet by mouth every 6 hours as needed for Pain.     lisinopril 40 MG tablet  Commonly known as: ZESTRIL   Take 1 tablet by mouth daily.     rivaroxaban 10 MG Tab  Commonly known as: XARELTO   Take 1 tablet by mouth daily for 14  Elver Russell [facility]  [address] [facility] [place], [address] [place] [address]  [phone]      Hospitalist Progress Note      NAME: Lalo Lara   :  1935  MRM:  [id number]    Date of service: 3/10/2023  8:05 AM       Assessment and Plan:   New onset afib RVR: likely precipitated by recent AAA repair, dehydration. TSH wnl.  Echo w/ EF 60-65%. Cont Amio, eliquis. No BB due to low BP. evaluated by cardiology. S/p cardioversion . Now in sinus rhythm      2. Hypotension: slowly improving. cont to hold norvasc, ACEi, HCTZ. cont metoprolol. Cont IVF, monitor closely     3. JT: resolved. Likely pre-renal.  Cont IVF, monitor BMP. Renal was following     4. UTI: urine Cx klebsiella. Cont IV CTX. Will switch to ceftin upon discharge      5. Recent AAA repair: Vasc following, no acute issues. Follow up with Dr. Jae Hayes     6. COPD: stable. Cont nebs prn     7. Diarrhea. Pt stated that it is resolving. Check stool for enteric pathogens. Subjective:     Chief Complaint[de-identified] Patient was seen and examined as a follow up for afib. Chart was reviewed. fells well. ROS:  (bold if positive, if negative)    Tolerating PT  Tolerating Diet        Objective:     Last 24hrs VS reviewed since prior progress note.  Most recent are:    Visit Vitals  BP (!) 148/66 (BP 1 Location: Left upper arm, BP Patient Position: At rest)   Pulse 96   Temp 98.3 °F (36.8 °C)   Resp 16   Ht 5' 1\" (1.549 m)   Wt 70.5 kg (155 lb 6.8 oz)   SpO2 94%   BMI 29.37 kg/m²     SpO2 Readings from Last 6 Encounters:   03/10/23 94%   23 90%   23 95%   23 96%   20 94%   17 98%          Intake/Output Summary (Last 24 hours) at 3/10/2023 0805  Last data filed at 3/10/2023 0318  Gross per 24 hour   Intake 645 ml   Output 100 ml   Net 545 ml        Physical Exam:    Gen:  Well-developed, well-nourished, in no acute distress  HEENT:  Pink conjunctivae, PERRL, hearing intact to voice, moist days. Do not start before November 13, 2020.         * This list has 2 medication(s) that are the same as other medications prescribed for you. Read the directions carefully, and ask your doctor or other care provider to review them with you.                  Discharge diet: Cardiac    Discharge activity: Per physical therapy, orthopedics      Discharge Instructions:  Follow-up instructions Discharge YNES and follow up with Primary care physician in one week  Orthopedics in 1 week  CODE STATUS: Full code  Immunizations: offered  Clinical integration: _  Coronary artery disease does not have  CHF does not have  Type 2 diabetes does not have  Acute stroke does not have  Depression does not have  Anticoagulation not applicable    Plan and findings discussed with the patient, questions answered. Patient understands plan of care.      Outpatient Follow Up  Follow-up Information     Follow up With Specialties Details Why Contact Info    Glenn Diehl MD Internal Medicine - Endocrinology,Diabetes,Metabolism, Internal Medicine In 1 week  [address] [facility] [address]  [phone]      Rolf Garcia MD Orthopedic Surgery On 11/24/2020 Right LAURA [address]  [facility] [address] [phone]          PCP:  Glenn Diehl MD         Total visit time >40 Min spent in discharge coordination and instructions. All questions answered prior to discharge    James Barnes MD  11/16/2020    mucous membranes  Neck:  Supple, without masses, thyroid non-tender  Resp:  No accessory muscle use, clear breath sounds without wheezes rales or rhonchi  Card:  No murmurs, normal S1, S2 without thrills, bruits or peripheral edema  Abd:  Soft, non-tender, non-distended, normoactive bowel sounds are present, no palpable organomegaly and no detectable hernias  Lymph:  No cervical or inguinal adenopathy  Musc:  No cyanosis or clubbing  Skin:  No rashes or ulcers, skin turgor is good  Neuro:  Cranial nerves are grossly intact, no focal motor weakness, follows commands appropriately  Psych:  Good insight, oriented to person, place and time, alert  __________________________________________________________________  Medications Reviewed: (see below)  Medications:     Current Facility-Administered Medications   Medication Dose Route Frequency    metoprolol succinate (TOPROL-XL) XL tablet 50 mg  50 mg Oral DAILY    amiodarone (CORDARONE) tablet 400 mg  400 mg Oral BID    albuterol-ipratropium (DUO-NEB) 2.5 MG-0.5 MG/3 ML  3 mL Nebulization Q6H PRN    cefTRIAXone (ROCEPHIN) 1 g in 0.9% sodium chloride 10 mL IV syringe  1 g IntraVENous Q24H    tiotropium-olodateroL (STIOLTO RESPIMAT) 2.5-2.5 mcg/actuation inhaler 2 Puff (Patient Supplied)  2 Puff Inhalation DAILY    sodium chloride (NS) flush 5-10 mL  5-10 mL IntraVENous PRN    [Held by provider] amLODIPine (NORVASC) tablet 5 mg  5 mg Oral QAM    [Held by provider] lisinopriL (PRINIVIL, ZESTRIL) tablet 20 mg  20 mg Oral DAILY    [Held by provider] hydroCHLOROthiazide (HYDRODIURIL) tablet 25 mg  25 mg Oral QAM    pravastatin (PRAVACHOL) tablet 40 mg  40 mg Oral QHS    sodium chloride (NS) flush 5-40 mL  5-40 mL IntraVENous Q8H    sodium chloride (NS) flush 5-40 mL  5-40 mL IntraVENous PRN    acetaminophen (TYLENOL) tablet 650 mg  650 mg Oral Q6H PRN    Or    acetaminophen (TYLENOL) suppository 650 mg  650 mg Rectal Q6H PRN    polyethylene glycol (MIRALAX) packet 17 g  17 g Oral DAILY PRN    ondansetron (ZOFRAN ODT) tablet 4 mg  4 mg Oral Q8H PRN    Or    ondansetron (ZOFRAN) injection 4 mg  4 mg IntraVENous Q6H PRN    apixaban (ELIQUIS) tablet 2.5 mg  2.5 mg Oral BID     Facility-Administered Medications Ordered in Other Encounters   Medication Dose Route Frequency    benzocaine (HURRICANE) 20 % spray   Mucous Membrane Rad Multiple    0.9% NaCl bacteriostatic (NORMAL SALINE) 0.9 % injection 10 mL  10 mL IntraVENous Rad Multiple    lidocaine (XYLOCAINE) 5 % ointment   Topical Rad Multiple    lidocaine (XYLOCAINE) 2 % viscous solution 15 mL  15 mL Mouth/Throat RAD PRN    midazolam (VERSED) injection 0.5-10 mg  0.5-10 mg IntraVENous Rad Multiple    fentaNYL citrate (PF) injection  mcg   mcg IntraVENous Rad Multiple        Lab Data Reviewed: (see below)  Lab Review:     Recent Labs     03/10/23  0457 03/08/23  0006   WBC 9.7 8.6   HGB 9.7* 9.6*   HCT 29.6* 29.1*    227     Recent Labs     03/10/23  0457 03/09/23  0017 03/08/23  0006    138 136   K 3.6 3.2* 3.0*    107 107   CO2 24 25 24   * 106* 108*   BUN 18 24* 37*   CREA 0.85 0.85 1.02   CA 8.7 8.5 8.2*   MG 1.7 1.6 2.0   PHOS 2.7 2.8 2.5*     Lab Results   Component Value Date/Time    Glucose (POC) 104 (H) 03/06/2023 06:57 PM    Glucose (POC) 130 (H) 03/03/2023 07:31 AM     No results for input(s): PH, PCO2, PO2, HCO3, FIO2 in the last 72 hours. No results for input(s): INR, INREXT in the last 72 hours.   All Micro Results       Procedure Component Value Units Date/Time    ENTERIC BACTERIA PANEL, DNA [[id number]]     Order Status: Sent Specimen: Stool     CULTURE, BLOOD [[id number]] Collected: 03/06/23 1834    Order Status: Completed Specimen: Blood Updated: 03/10/23 0615     Special Requests: NO SPECIAL REQUESTS        Culture result: NO GROWTH 4 DAYS       CULTURE, BLOOD [[id number]] Collected: 03/06/23 1834    Order Status: Completed Specimen: Blood Updated: 03/10/23 0615     Special Requests: NO SPECIAL REQUESTS        Culture result: NO GROWTH 4 DAYS       CULTURE, URINE [[id number]]  (Abnormal)  (Susceptibility) Collected: 03/06/23 1958    Order Status: Completed Specimen: Urine Updated: 03/09/23 0925     Special Requests: --        NO SPECIAL REQUESTS  Reflexed from [id number]       [place] Count --        >100,000  COLONIES/mL       Culture result: Klebsiella pneumoniae       URINE CULTURE HOLD SAMPLE [[id number]]     Order Status: Canceled Specimen: Urine             I have reviewed notes of prior 24hr. Other pertinent lab: Total time: -35- minutes **I personally saw and examined the patient during this time period**     I personally reviewed chart, notes, data and current medications in the medical record. I have personally examined and treated the patient at bedside during this period.                  Care Plan discussed with: Patient, Nursing Staff, and >50% of time spent in counseling and coordination of care    Discussed:  Care Plan    Prophylaxis:   eliquis    Disposition:  Home w/Family           ___________________________________________________    Attending Physician: Argentina Ochoa MD

## 2023-03-10 NOTE — ROUTINE PROCESS
8270  Patient has had 4 loose bowel movements so far this shift. Notified Cecelia Roque NP. No new orders at this time. 0700  Bedside and Verbal shift change report given to [facility] (oncoming nurse) by Snow Canchola (offgoing nurse). Report included the following information SBAR, Kardex, ED Summary, Procedure Summary, Intake/Output, MAR, Recent Results, and Cardiac Rhythm NSR .

## 2023-03-10 NOTE — PROGRESS NOTES
Cardiology Update:     Remains in SR, increase metoprolol - hold other home BP meds and will re evaluate in office if need to resume. Cont PO amio - 400 mg bid x 1 week, then decrease to 200 mg daily. Cont Eliquis, monitor electrolytes. Will arrange OP follow up. Pt ok for discharge from cardiac standpoint, we will sign off - please call with questions.      Visit Vitals  BP (!) 148/66 (BP 1 Location: Left upper arm, BP Patient Position: At rest)   Pulse 96   Temp 98.3 °F (36.8 °C)   Resp 16   Ht 5' 1\" (1.549 m)   Wt 70.5 kg (155 lb 6.8 oz)   SpO2 94%   BMI 29.37 kg/m²

## 2023-03-10 NOTE — PROGRESS NOTES
0700 Bedside and Verbal shift change report given to [facility] Marisol Nurse(oncoming nurse) by José Miguel Negron RN (offgoing nurse). Report included the following information SBAR, Kardex, ED Summary, Intake/Output, MAR, Recent Results, and Cardiac Rhythm NSR . This patient was assisted with Intentional Toileting every 2 hours during this shift as appropriate. Documentation of ambulation and output reflected on Flowsheet as appropriate. Purposeful hourly rounding was completed using AIDET and 5Ps. Outcomes of PHR documented as they occurred. Bed alarm in use as appropriate. Dual Suction and ambubag in place. 0730 Notified Dr. Romana Dela Cruz of loose stools overnight. MD placed orders for stool samples. Will attempt to obtain. 1415 Discharge paperwork reviewed with Pt and Pt family, signed and placed in Pt chart. All questions answered. Pt to be discharged home, transported by Daughter. Unable to collect stool sample as Pt has not had a BM during this RN's shift.

## 2023-03-12 LAB
BACTERIA SPEC CULT: NORMAL
BACTERIA SPEC CULT: NORMAL
SERVICE CMNT-IMP: NORMAL
SERVICE CMNT-IMP: NORMAL

## 2023-03-23 NOTE — PROGRESS NOTES
Patient: Juan Lancaster  : 1935    Primary Cardiologist: Karla Conti. Shirin Sylvester MD, [facility]  PCP: Booker Wallace MD    Today's Date: 3/24/2023      ASSESSMENT AND PLAN:     Assessment and Plan:  Hospital Follow Up  - admitted to [facility] on 3/6-3/10/23 found to be in afib RVR with hypotension    2. Atrial fibrillation   - on 3/9/23 - had SELVIN with cardioversion to NSR  - continue amio 200 mg daily, toprol xl   - continue eliquis 2.5 mg BID (was started on low dose age, kidney function), no s/s of bleeding - changed to 5 mg BID now that kidney function improved  - regular rhythm on exam    3. HTN  - held due to hypotension on admission   - was previously on amlodipine 5 mg daily, benazepril 20 mg daily,   - PCP added back HCTZ 25 mg daily    4. AAA repair  - had endovascular abdominal aortic aneurysm repair on 3/2/23 with Dr. Juarez Steven ICD-9-CM    1. Paroxysmal atrial fibrillation (HCC)  I48.0 427.31 apixaban (ELIQUIS) 5 mg tablet      2. Hypertension, unspecified type  I10 401.9       3. History of abdominal aortic aneurysm (AAA) repair  Z98.890 V45.89       4. Hospital discharge follow-up  Z09 V67.59 IA DISCHRG MEDS RECONCILED W/CURRENT MED LIST           HISTORY OF PRESENT ILLNESS:     History of Present Illness:  Juan Lancaster is a 82 y.o. F who presents today for hospital follow-up. Patient had endovascular AAA repair on 3/2/23 with Dr. Mirtha Bailey. Patient presented to ER with hypotension that was noted by home PT. Pt with copious amounts of diarrhea, not eating or drinking much and continued taking BP medications. She presented in the ER with afib with RVR, hypotension, hypokalemia, hypomagnesemia. Also noted with acute kidney injury (cr 1.89)  that resolved prior to discharge. Had SELVIN with cardioversion to NSR on 3/9/23. Was started on eliquis, metoprolol, amiodarone at discharge. Presents to visit today with son. Tells me that HR is controlled on fitbit.  She is doing well overall. Denies chest pain, edema, syncope, shortness of breath at rest, dyspnea on exertion, PND or orthopnea. Has no tachycardia, palpitations or sense of arrhythmia. PAST MEDICAL HISTORY:     Past Medical History:   Diagnosis Date    Abdominal aneurysm     Arthritis     Chronic obstructive pulmonary disease ([facility] [address]     Hyperlipidemia     Hypertension        Past Surgical History:   Procedure Laterality Date    COLONOSCOPY N/A 2017    COLONOSCOPY, EGD performed by Shanthi Ruiz MD at [address] HEENT Bilateral 2023    cateract surgery    HX ORTHOPAEDIC Left     ORIF left wrist       CURRENT MEDICATIONS:    .  Current Outpatient Medications   Medication Sig Dispense Refill    hydroCHLOROthiazide (HYDRODIURIL) 25 mg tablet       apixaban (ELIQUIS) 5 mg tablet Take 1 Tablet by mouth two (2) times a day. 60 Tablet 5    amiodarone (CORDARONE) 200 mg tablet Take 1 Tablet by mouth daily for 30 days. 30 Tablet 0    metoprolol succinate (TOPROL-XL) 50 mg XL tablet Take 1 Tablet by mouth daily for 60 days. 30 Tablet 1    loratadine (CLARITIN) 10 mg tablet Take 10 mg by mouth Every morning. cholecalciferol, vitamin d3, 10 mcg (400 unit) cap Take 1 Tablet by mouth Every morning. tiotropium-olodateroL (Stiolto Respimat) 2.5-2.5 mcg/actuation inhaler Take 2 Puffs by inhalation Every morning. fish oil-omega-3 fatty acids 340-1,000 mg capsule Take 2 Capsules by mouth daily. pravastatin (PRAVACHOL) 40 mg tablet Take 40 mg by mouth nightly.          Allergies   Allergen Reactions    Tetracycline Other (comments)     Mouth soreness         SOCIAL HISTORY:     Social History     Tobacco Use    Smoking status: Former     Packs/day: 1.00     Years: 35.00     Pack years: 35.00     Types: Cigarettes     Quit date: 2015     Years since quittin.2    Smokeless tobacco: Never   Vaping Use    Vaping Use: Never used   Substance Use Topics    Alcohol use: No    Drug use: No       FAMILY HISTORY:     Family History   Problem Relation Age of Onset    Cancer Brother        REVIEW OF SYMPTOMS:     Review of Symptoms:  Negative except as above, all other systems reviewed and are negative for a Comprehensive ROS (10+)    PHYSICAL EXAM:     Physical Exam:  Visit Vitals  /62 (BP 1 Location: Right upper arm, BP Patient Position: Sitting)   Pulse 72   Ht 5' 1\" (1.549 m)   Wt 150 lb (68 kg)   SpO2 92%   BMI 28.34 kg/m²       General appearance: alert, cooperative, no distress, appears stated age  Neck: supple, symmetrical, trachea midline, no adenopathy, thyroid: not enlarged, symmetric, no tenderness/mass/nodules, no carotid bruit, and no JVD  Lungs: clear to auscultation bilaterally  Heart: regular rate and rhythm, S1, S2 normal, no murmur, click, rub or gallop  Extremities: extremities normal, atraumatic, no cyanosis or edema    LABS / OTHER STUDIES:     Lab Results   Component Value Date/Time    Sodium 136 03/10/2023 04:57 AM    Potassium 3.6 03/10/2023 04:57 AM    Chloride 105 03/10/2023 04:57 AM    CO2 24 03/10/2023 04:57 AM    Anion gap 7 03/10/2023 04:57 AM    Glucose 103 (H) 03/10/2023 04:57 AM    BUN 18 03/10/2023 04:57 AM    Creatinine 0.85 03/10/2023 04:57 AM    BUN/Creatinine ratio 21 (H) 03/10/2023 04:57 AM    GFR est AA >60 01/12/2020 09:09 PM    GFR est non-AA 50 (L) 01/12/2020 09:09 PM    Calcium 8.7 03/10/2023 04:57 AM    Bilirubin, total 0.6 03/06/2023 06:10 PM    Alk.  phosphatase 77 03/06/2023 06:10 PM    Protein, total 6.4 03/06/2023 06:10 PM    Albumin 2.2 (L) 03/06/2023 06:10 PM    Globulin 4.2 (H) 03/06/2023 06:10 PM    A-G Ratio 0.5 (L) 03/06/2023 06:10 PM    ALT (SGPT) 20 03/06/2023 06:10 PM    AST (SGOT) 24 03/06/2023 06:10 PM       Lab Results   Component Value Date/Time    Cholesterol, total 136 09/09/2014 03:00 AM    HDL Cholesterol 38 09/09/2014 03:00 AM    LDL, calculated 62 09/09/2014 03:00 AM    VLDL, calculated 36 09/09/2014 03:00 AM    Triglyceride 180 (H) 09/09/2014 03:00 AM    CHOL/HDL Ratio 3.6 09/09/2014 03:00 AM     Lab Results   Component Value Date/Time    TSH 2.97 03/06/2023 09:46 PM       CARDIAC DIAGNOSTICS:     Cardiac Evaluation Includes:  I reviewed the test results below. 03/06/23    ECHO SELVIN W POSSIBLE CARDIOVERSION 03/09/2023 3/9/2023    Interpretation Summary    Left Atrium: No left atrial appendage thrombus noted. No left atrial appendage mass noted. Successful cardioversion to Sinus Rhythm with 200J.     Signed by: Anay Lorenz MD on 3/9/2023  3:14 PM               EKG Results       None                 Future Appointments   Date Time Provider Hoda Lazaristi   6/29/2023 11:20 AM Anival Peres MD [facility] BS AMB         Xander Cruz, [facility] [address], [address] [place] [address]    Ph: [phone]

## 2023-03-24 ENCOUNTER — OFFICE VISIT (OUTPATIENT)
Dept: CARDIOLOGY CLINIC | Age: 88
End: 2023-03-24

## 2023-03-24 VITALS
BODY MASS INDEX: 28.32 KG/M2 | OXYGEN SATURATION: 92 % | HEART RATE: 72 BPM | WEIGHT: 150 LBS | SYSTOLIC BLOOD PRESSURE: 138 MMHG | HEIGHT: 61 IN | DIASTOLIC BLOOD PRESSURE: 62 MMHG

## 2023-03-24 DIAGNOSIS — Z09 HOSPITAL DISCHARGE FOLLOW-UP: ICD-10-CM

## 2023-03-24 DIAGNOSIS — I10 HYPERTENSION, UNSPECIFIED TYPE: ICD-10-CM

## 2023-03-24 DIAGNOSIS — I48.0 PAROXYSMAL ATRIAL FIBRILLATION (HCC): Primary | ICD-10-CM

## 2023-03-24 DIAGNOSIS — Z98.890 HISTORY OF ABDOMINAL AORTIC ANEURYSM (AAA) REPAIR: ICD-10-CM

## 2023-03-24 RX ORDER — HYDROCHLOROTHIAZIDE 25 MG/1
TABLET ORAL
COMMUNITY
Start: 2023-03-16

## 2023-03-24 NOTE — LETTER
3/24/2023    Patient: Irving Alejandra   YOB: 1935   Date of Visit: 3/24/2023     Nirali De Souza MD  [address] [place] [id number]  Via Fax: [phone]    Dear Nirali De Souza MD,      Thank you for referring Ms. Irving Alejandra to [address] [place] [address] for evaluation. My notes for this consultation are attached. If you have questions, please do not hesitate to call me. I look forward to following your patient along with you.       Sincerely,    AYSE Win

## 2023-03-24 NOTE — PROGRESS NOTES
Chief Complaint   Patient presents with    Hospital Follow Up     3/6 ER  Afib     Hypertension     Vitals:    03/24/23 1110   BP: 138/62   BP 1 Location: Right upper arm   BP Patient Position: Sitting   Pulse: 72   Height: 5' 1\" (1.549 m)   Weight: 150 lb (68 kg)   SpO2: 92%       Chest pain denied     SOB denied     Palpitations denied     Swelling in hands/feet denied     Dizziness denied     Recent hospital stays denied     Refills denied

## 2023-03-28 ENCOUNTER — PATIENT MESSAGE (OUTPATIENT)
Dept: CARDIOLOGY CLINIC | Age: 88
End: 2023-03-28

## 2023-03-28 DIAGNOSIS — I48.0 PAROXYSMAL ATRIAL FIBRILLATION (HCC): Primary | ICD-10-CM

## 2023-03-28 DIAGNOSIS — I48.0 PAROXYSMAL ATRIAL FIBRILLATION (HCC): ICD-10-CM

## 2023-03-28 RX ORDER — AMIODARONE HYDROCHLORIDE 100 MG/1
100 TABLET ORAL DAILY
Qty: 90 TABLET | Refills: 1 | Status: SHIPPED | OUTPATIENT
Start: 2023-03-28 | End: 2023-03-31 | Stop reason: SDUPTHER

## 2023-03-31 RX ORDER — AMIODARONE HYDROCHLORIDE 100 MG/1
100 TABLET ORAL DAILY
Qty: 90 TABLET | Refills: 1 | Status: SHIPPED | OUTPATIENT
Start: 2023-03-31

## 2023-04-14 ENCOUNTER — TELEPHONE (OUTPATIENT)
Dept: CARDIOLOGY CLINIC | Age: 88
End: 2023-04-14

## 2023-04-14 DIAGNOSIS — I48.0 PAROXYSMAL ATRIAL FIBRILLATION (HCC): ICD-10-CM

## 2023-04-17 RX ORDER — PRAVASTATIN SODIUM 40 MG/1
40 TABLET ORAL
Qty: 90 TABLET | Refills: 3 | Status: SHIPPED | OUTPATIENT
Start: 2023-04-17

## 2023-04-17 RX ORDER — METOPROLOL SUCCINATE 50 MG/1
50 TABLET, EXTENDED RELEASE ORAL DAILY
Qty: 90 TABLET | Refills: 3 | Status: SHIPPED | OUTPATIENT
Start: 2023-04-17

## 2023-04-17 RX ORDER — AMIODARONE HYDROCHLORIDE 200 MG/1
200 TABLET ORAL DAILY
Qty: 90 TABLET | Refills: 3 | Status: SHIPPED | OUTPATIENT
Start: 2023-04-17 | End: 2023-04-19 | Stop reason: SDUPTHER

## 2023-04-17 RX ORDER — HYDROCHLOROTHIAZIDE 25 MG/1
25 TABLET ORAL DAILY
Qty: 90 TABLET | Refills: 3 | Status: SHIPPED | OUTPATIENT
Start: 2023-04-17

## 2023-04-19 DIAGNOSIS — I48.0 PAROXYSMAL ATRIAL FIBRILLATION (HCC): ICD-10-CM

## 2023-04-19 RX ORDER — AMIODARONE HYDROCHLORIDE 100 MG/1
100 TABLET ORAL DAILY
Qty: 90 TABLET | Refills: 3 | Status: SHIPPED | OUTPATIENT
Start: 2023-04-19

## 2023-04-19 NOTE — TELEPHONE ENCOUNTER
Refill per VO of Dr. Garrett Regalado  Last appt: 3/24/2023  Future Appointments   Date Time Provider Hoda Mitzy   6/29/2023 11:20 AM Jose Peres MD [facility] AMB       Requested Prescriptions     Signed Prescriptions Disp Refills    amiodarone (PACERONE) 100 mg tablet 90 Tablet 3     Sig: Take 1 Tablet by mouth daily.      Authorizing Provider: Peter Tyler     Ordering User: Marty Cnonor

## 2023-06-29 ENCOUNTER — OFFICE VISIT (OUTPATIENT)
Age: 88
End: 2023-06-29
Payer: MEDICARE

## 2023-06-29 VITALS
DIASTOLIC BLOOD PRESSURE: 76 MMHG | OXYGEN SATURATION: 97 % | WEIGHT: 154 LBS | HEIGHT: 61 IN | HEART RATE: 76 BPM | BODY MASS INDEX: 29.07 KG/M2 | SYSTOLIC BLOOD PRESSURE: 134 MMHG

## 2023-06-29 DIAGNOSIS — I48.0 PAROXYSMAL ATRIAL FIBRILLATION (HCC): Primary | ICD-10-CM

## 2023-06-29 DIAGNOSIS — I71.43 INFRARENAL ABDOMINAL AORTIC ANEURYSM (AAA) WITHOUT RUPTURE (HCC): ICD-10-CM

## 2023-06-29 DIAGNOSIS — I10 ESSENTIAL (PRIMARY) HYPERTENSION: ICD-10-CM

## 2023-06-29 PROCEDURE — 3078F DIAST BP <80 MM HG: CPT | Performed by: INTERNAL MEDICINE

## 2023-06-29 PROCEDURE — 3075F SYST BP GE 130 - 139MM HG: CPT | Performed by: INTERNAL MEDICINE

## 2023-06-29 PROCEDURE — 99214 OFFICE O/P EST MOD 30 MIN: CPT | Performed by: INTERNAL MEDICINE

## 2023-06-29 PROCEDURE — 1123F ACP DISCUSS/DSCN MKR DOCD: CPT | Performed by: INTERNAL MEDICINE

## 2023-06-29 RX ORDER — ASPIRIN 81 MG/1
81 TABLET ORAL DAILY
COMMUNITY

## 2023-06-29 RX ORDER — HYDROCHLOROTHIAZIDE 25 MG/1
TABLET ORAL
COMMUNITY
Start: 2023-04-01

## 2023-06-29 RX ORDER — OMEGA-3S/DHA/EPA/FISH OIL 1000-1400
1400 CAPSULE,DELAYED RELEASE (ENTERIC COATED) ORAL 2 TIMES DAILY
COMMUNITY

## 2023-06-29 RX ORDER — MONTELUKAST SODIUM 10 MG/1
TABLET ORAL
COMMUNITY
Start: 2023-06-25

## 2023-06-29 RX ORDER — AMIODARONE HYDROCHLORIDE 200 MG/1
100 TABLET ORAL DAILY
COMMUNITY
Start: 2023-04-17 | End: 2023-06-29 | Stop reason: ALTCHOICE

## 2024-01-11 NOTE — PROGRESS NOTES
Office Follow-up    NAME: Joanne Ruiz   :  1935  MRM:  [id number]    Date:  2024    Primary Cardiologist:Pratik Marquez MD, [facility]  Last Office Visit: 23        Assessment and Plan:       1. Atrial fibrillation    -admitted to [facility] on 3/6-3/10/23 found to be in afib RVR with hypotension  -on 3/9/23 - had HARVEY with cardioversion to NSR   - Continue Toprol xl    - continue eliquis 5 mg BID    - NSR on EKG 24      2. HTN   - cont HCTZ 25 mg daily, toprol xl 100 mg daily, lisinopril 5 mg daily  - says BP at home 140/80s usually      3. AAA repair   - had endovascular abdominal aortic aneurysm repair on 3/2/23 with Dr. Jason Peace     4. See Dr. Marquez in 6 months.                     ATTENTION:   This medical record was transcribed using an electronic medical records/speech recognition system.  Although proofread, it may and can contain electronic, spelling and other errors.  Corrections may be executed at a later time.  Please feel free to contact us for any clarifications as needed.      Subjective:     Presents today for follow-up. Doing well. Denies chest pain, shortness of breath, palpitations, edema. Had BP medications adjusted by PCP. BP at home usually 140s/80.   ----------  Joanne Ruiz, a 88 y.o. year-old who presents for followup. No chest pain, SOB, Palpitations.     Exam:     BP (!) 150/62   Pulse 67   Resp 18   Ht 1.549 m (5' 1\")   Wt 70.3 kg (155 lb)   SpO2 94%   BMI 29.29 kg/m²      General appearance - alert, well appearing, and in no distress  Mental status - affect appropriate to mood  Eyes - sclera anicteric, moist mucous membranes  Neck - supple, no significant adenopathy    Medications:     Current Outpatient Medications   Medication Sig    lisinopril (PRINIVIL;ZESTRIL) 5 MG tablet Take 1 tablet by mouth daily    Multiple Vitamins-Minerals (CENTRUM SILVER PO) Take by mouth    hydroCHLOROthiazide (HYDRODIURIL) 25 MG tablet TAKE 1 TABLET BY MOUTH IN THE MORNING ONCE

## 2024-01-17 ENCOUNTER — OFFICE VISIT (OUTPATIENT)
Age: 89
End: 2024-01-17
Payer: MEDICARE

## 2024-01-17 VITALS
HEIGHT: 61 IN | BODY MASS INDEX: 29.27 KG/M2 | SYSTOLIC BLOOD PRESSURE: 150 MMHG | WEIGHT: 155 LBS | HEART RATE: 67 BPM | OXYGEN SATURATION: 94 % | DIASTOLIC BLOOD PRESSURE: 62 MMHG | RESPIRATION RATE: 18 BRPM

## 2024-01-17 DIAGNOSIS — I71.43 INFRARENAL ABDOMINAL AORTIC ANEURYSM (AAA) WITHOUT RUPTURE (HCC): ICD-10-CM

## 2024-01-17 DIAGNOSIS — I48.0 PAROXYSMAL ATRIAL FIBRILLATION (HCC): ICD-10-CM

## 2024-01-17 DIAGNOSIS — I10 ESSENTIAL (PRIMARY) HYPERTENSION: Primary | ICD-10-CM

## 2024-01-17 PROCEDURE — 93005 ELECTROCARDIOGRAM TRACING: CPT

## 2024-01-17 PROCEDURE — 1123F ACP DISCUSS/DSCN MKR DOCD: CPT

## 2024-01-17 PROCEDURE — 93010 ELECTROCARDIOGRAM REPORT: CPT

## 2024-01-17 PROCEDURE — 99214 OFFICE O/P EST MOD 30 MIN: CPT

## 2024-01-17 RX ORDER — LISINOPRIL 5 MG/1
5 TABLET ORAL DAILY
COMMUNITY

## 2024-01-17 NOTE — PROGRESS NOTES
Chief Complaint   Patient presents with    Atrial Fibrillation    Hypertension    Valvular Heart Disease     AAA repair         Chest Pain  No      Last Lipids No results found for: \"CHOL\", \"TRIG\", \"HDL\", \"LDLCHOLESTEROL\", \"LDLCALC\", \"LABVLDL\", \"VLDL\", \"CHOLHDLRATIO\"     Refills    BP (!) 150/62   Pulse 67   Resp 18   Ht 1.549 m (5' 1\")   Wt 70.3 kg (155 lb)   SpO2 94%   BMI 29.29 kg/m²       Have you been to the ER, urgent care clinic since your last visit? No  Hospitalized since your last visit? NO    2. Have you seen or consulted with any other health care providers outside of the [facility] since your last visit?  No

## 2024-07-22 ENCOUNTER — OFFICE VISIT (OUTPATIENT)
Age: 89
End: 2024-07-22
Payer: MEDICARE

## 2024-07-22 VITALS
SYSTOLIC BLOOD PRESSURE: 116 MMHG | HEIGHT: 61 IN | WEIGHT: 149.8 LBS | BODY MASS INDEX: 28.28 KG/M2 | DIASTOLIC BLOOD PRESSURE: 70 MMHG | HEART RATE: 69 BPM | OXYGEN SATURATION: 96 %

## 2024-07-22 DIAGNOSIS — I10 ESSENTIAL (PRIMARY) HYPERTENSION: Primary | ICD-10-CM

## 2024-07-22 DIAGNOSIS — I48.0 PAROXYSMAL ATRIAL FIBRILLATION (HCC): ICD-10-CM

## 2024-07-22 DIAGNOSIS — I71.43 INFRARENAL ABDOMINAL AORTIC ANEURYSM (AAA) WITHOUT RUPTURE (HCC): ICD-10-CM

## 2024-07-22 PROCEDURE — 1123F ACP DISCUSS/DSCN MKR DOCD: CPT | Performed by: INTERNAL MEDICINE

## 2024-07-22 PROCEDURE — 99214 OFFICE O/P EST MOD 30 MIN: CPT | Performed by: INTERNAL MEDICINE

## 2024-07-22 NOTE — PROGRESS NOTES
Office Follow-up    NAME: Joanne Ruiz   :  1935  MRM:  [id number]    Date:  2024    Primary Cardiologist:Pratik Marquez MD, [facility]        Assessment and Plan:       1. Atrial fibrillation    -admitted to [facility] on 3/6-3/10/23 found to be in afib RVR with hypotension  -on 3/9/23 - had HARVEY with cardioversion to NSR   - Continue Toprol xl    - continue eliquis 5 mg BID    - NSR on EKG 24  - NSR on exam.       2. HTN   - cont HCTZ 25 mg daily, toprol xl 100 mg daily, lisinopril 5 mg daily  - says BP at home 140/80s usually      3. AAA repair   - had endovascular abdominal aortic aneurysm repair on 3/2/23 with Dr. Jason Peace     4. See Marine Walton NP in 6 months.                     ATTENTION:   This medical record was transcribed using an electronic medical records/speech recognition system.  Although proofread, it may and can contain electronic, spelling and other errors.  Corrections may be executed at a later time.  Please feel free to contact us for any clarifications as needed.      Subjective:     Presents today for follow-up. Doing well. Denies chest pain, shortness of breath, palpitations, edema. Had BP medications adjusted by PCP.   ----------  Joanne Ruiz, a 88 y.o. year-old who presents for followup. No chest pain, SOB, Palpitations.     Exam:     /70 (Site: Left Upper Arm, Position: Sitting)   Pulse 69   Ht 1.549 m (5' 1\")   Wt 67.9 kg (149 lb 12.8 oz)   SpO2 96%   BMI 28.30 kg/m²      General appearance - alert, well appearing, and in no distress  Mental status - affect appropriate to mood  Eyes - sclera anicteric, moist mucous membranes  Neck - supple, no significant adenopathy    Medications:     Current Outpatient Medications   Medication Sig    lisinopril (PRINIVIL;ZESTRIL) 5 MG tablet Take 1 tablet by mouth daily    Multiple Vitamins-Minerals (CENTRUM SILVER PO) Take by mouth    hydroCHLOROthiazide (HYDRODIURIL) 25 MG tablet TAKE 1 TABLET BY MOUTH IN THE MORNING

## 2024-07-22 NOTE — PROGRESS NOTES
Chief Complaint   Patient presents with    Hypertension    Other     PAF  AAA     Vitals:    07/22/24 1553   BP: 116/70   Site: Left Upper Arm   Position: Sitting   Pulse: 69   SpO2: 96%   Weight: 67.9 kg (149 lb 12.8 oz)   Height: 1.549 m (5' 1\")       Chest pain: DENIED     Recent hospital stays: DENIED     Refills: DENIED

## 2025-01-07 NOTE — PROGRESS NOTES
Office Follow-up    NAME: Joanne Ruiz   :  1935  MRM:  [id number]    Date:  2025    Primary Cardiologist:Pratik Marquez MD, [facility]  BRITTNEE Tilley - NP         Assessment and Plan:     1. Atrial fibrillation    -admitted to [facility] on 3/6-3/10/23 found to be in afib RVR with hypotension  -on 3/9/23 - had HARVEY with cardioversion to NSR  - sounds as if she is having episodes of PAF lasting 1-2min about 2x in the last 6months   - Continue Toprol xl, eliquis 5 mg BID    - Remains in NSR   - EKG today SR      2. HTN   - White coat, and occasional variable BP  - cont HCTZ 25 mg daily, toprol xl 100 mg daily, lisinopril 5 mg daily  - says BP at home 140/80s usually  Per PCP      3. AAA repair   - had endovascular abdominal aortic aneurysm repair on 3/2/23 with Dr. Jason Peace     4. See Dr. Marquez in 9 months.                 ATTENTION:   This medical record was transcribed using an electronic medical records/speech recognition system.  Although proofread, it may and can contain electronic, spelling and other errors.  Corrections may be executed at a later time.  Please feel free to contact us for any clarifications as needed.      Subjective:     Presents today for follow-up. Doing well. Sounds as if she is having episodes of PAF lasting 1-2min about 2x in the last 6months. Denies chest pain, shortness of breath, edema. Had BP medications adjusted by PCP.   ----------  Joanne Ruiz, a 89 y.o. year-old who presents for followup. No chest pain, SOB, Palpitations.     Exam:     BP (!) 155/85   Pulse 55   Ht 1.549 m (5' 1\")   Wt 69.9 kg (154 lb)   SpO2 94%   BMI 29.10 kg/m²      General appearance - alert, well appearing, and in no distress  Mental status - affect appropriate to mood  Eyes - sclera anicteric, moist mucous membranes  Neck - supple, no significant adenopathy    Medications:     Current Outpatient Medications   Medication Sig    albuterol (PROVENTIL) (5 MG/ML) 0.5% nebulizer solution

## 2025-01-09 ENCOUNTER — OFFICE VISIT (OUTPATIENT)
Age: 89
End: 2025-01-09
Payer: MEDICARE

## 2025-01-09 VITALS
WEIGHT: 154 LBS | BODY MASS INDEX: 29.07 KG/M2 | DIASTOLIC BLOOD PRESSURE: 85 MMHG | HEART RATE: 55 BPM | OXYGEN SATURATION: 94 % | SYSTOLIC BLOOD PRESSURE: 155 MMHG | HEIGHT: 61 IN

## 2025-01-09 DIAGNOSIS — I48.0 PAROXYSMAL ATRIAL FIBRILLATION (HCC): Primary | ICD-10-CM

## 2025-01-09 DIAGNOSIS — I10 ESSENTIAL (PRIMARY) HYPERTENSION: ICD-10-CM

## 2025-01-09 DIAGNOSIS — I71.43 INFRARENAL ABDOMINAL AORTIC ANEURYSM (AAA) WITHOUT RUPTURE (HCC): ICD-10-CM

## 2025-01-09 PROCEDURE — 99214 OFFICE O/P EST MOD 30 MIN: CPT

## 2025-01-09 PROCEDURE — 1160F RVW MEDS BY RX/DR IN RCRD: CPT

## 2025-01-09 PROCEDURE — 1126F AMNT PAIN NOTED NONE PRSNT: CPT

## 2025-01-09 PROCEDURE — 1123F ACP DISCUSS/DSCN MKR DOCD: CPT

## 2025-01-09 PROCEDURE — 93010 ELECTROCARDIOGRAM REPORT: CPT

## 2025-01-09 PROCEDURE — 93005 ELECTROCARDIOGRAM TRACING: CPT

## 2025-01-09 PROCEDURE — 1159F MED LIST DOCD IN RCRD: CPT

## 2025-01-09 RX ORDER — ALBUTEROL SULFATE 5 MG/ML
2.5 SOLUTION RESPIRATORY (INHALATION) EVERY 6 HOURS PRN
COMMUNITY

## 2025-01-09 RX ORDER — IPRATROPIUM BROMIDE AND ALBUTEROL SULFATE 2.5; .5 MG/3ML; MG/3ML
1 SOLUTION RESPIRATORY (INHALATION) EVERY 4 HOURS
COMMUNITY

## 2025-01-09 NOTE — PROGRESS NOTES
had concerns including Hypertension and Atrial Fibrillation.    Vitals:    01/09/25 1307 01/09/25 1329   BP: (!) 180/90 (!) 180/90   Site: Left Upper Arm Right Upper Arm   Position: Sitting Sitting   Pulse: 55    SpO2: 94%    Weight: 69.9 kg (154 lb)    Height: 1.549 m (5' 1\")         Chest pain No    Refills No        1. Have you been to the ER, urgent care clinic since your last visit? No       Hospitalized since your last visit? No       Where?        Date?

## (undated) DEVICE — BASIN EMSIS 16OZ GRAPHITE PLAS KID SHP MOLD GRAD FOR ORAL

## (undated) DEVICE — FLOSEAL MATRIX IS INDICATED IN SURGICAL PROCEDURES (OTHER THAN IN OPHTHALMIC) AS AN ADJUNCT TO HEMOSTASIS WHEN CONTROL OF BLEEDING BY LIGATURE OR CONVENTIONALPROCEDURES IS INEFFECTIVE OR IMPRACTICAL.: Brand: FLOSEAL HEMOSTATIC MATRIX

## (undated) DEVICE — Device

## (undated) DEVICE — VASCULAR-SFMC: Brand: MEDLINE INDUSTRIES, INC.

## (undated) DEVICE — CATH IV AUTOGRD BC PNK 20GA 25 -- INSYTE

## (undated) DEVICE — RADIFOCUS GLIDEWIRE: Brand: GLIDEWIRE

## (undated) DEVICE — SUT PROL 6-0 24IN BV1 DA BLU --

## (undated) DEVICE — ROADRUNNER, PC WIRE GUIDE THE FIRM: Brand: ROADRUNNER

## (undated) DEVICE — SYR LR LCK 1ML GRAD NSAF 30ML --

## (undated) DEVICE — APPLIER CLP 12FR L86CM W/ ENDOANCHOR CASS FOR EVAR HELI-FX

## (undated) DEVICE — TRAP SUC MUCOUS 70ML -- MEDICHOICE MEDLINE

## (undated) DEVICE — SYR POWER 150ML 8IN FILL TUBE --

## (undated) DEVICE — SET GRAV CK VLV NEEDLESS ST 3 GANGED 4WAY STPCOCK HI FLO 10

## (undated) DEVICE — (D)CUP DENT 7.5OZ PLAS DSTY -- DISC BY MFR USE ITEM 341725

## (undated) DEVICE — SYR ASSEMB INFL BLLN 60ML --

## (undated) DEVICE — GUIDEWIRE ENDOSCP L150CM DIA0.035IN TIP L15CM BENT PTFE

## (undated) DEVICE — SOLIDIFIER MEDC 1200ML -- CONVERT TO 356117

## (undated) DEVICE — C-ARM: Brand: UNBRANDED

## (undated) DEVICE — BITEBLOCK ENDOSCP 60FR MAXI WHT POLYETH STURDY W/ VELC WVN

## (undated) DEVICE — CATHETER ANGIO PIG MB FLSH 5 FRX65 CM 6 SH SUPER TORQUE

## (undated) DEVICE — KIT COLON W/ 1.1OZ LUB AND 2 END

## (undated) DEVICE — COVER US PRB W15XL120CM W/ GEL RUBBERBAND TAPE STRP FLD GEN

## (undated) DEVICE — TUBE EXTN L48IN HI PRSS W/ ROT ADPT

## (undated) DEVICE — BAG BELONG PT PERS CLEAR HANDL

## (undated) DEVICE — CANN NASAL O2 CAPNOGRAPHY AD -- FILTERLINE

## (undated) DEVICE — SYRINGE 50ML E/T

## (undated) DEVICE — ESOPHAGEAL BALLOON DILATATION CATHETER: Brand: CRE FIXED WIRE

## (undated) DEVICE — COVER,TABLE,60X90,STERILE: Brand: MEDLINE

## (undated) DEVICE — PINNACLE INTRODUCER SHEATH: Brand: PINNACLE

## (undated) DEVICE — CATH BLLN PTA .035 7X40X80 -- EVERCROSS OTW

## (undated) DEVICE — 3M™ CUROS™ DISINFECTING CAP FOR NEEDLELESS CONNECTORS 270/CARTON 20 CARTONS/CASE CFF1-270: Brand: CUROS™

## (undated) DEVICE — KENDALL RADIOLUCENT FOAM MONITORING ELECTRODE -RECTANGULAR SHAPE: Brand: KENDALL

## (undated) DEVICE — BAG SPEC BIOHZRD 10 X 10 IN --

## (undated) DEVICE — SUT VCRL 3-0 27IN SH UD --

## (undated) DEVICE — PART NUMBER 108260, VTI 8 MHZ DISPOSABLE DOPPLER PROBE, STRAIGHT, BOX: Brand: VTI 8 MHZ DISPOSABLE DOPPLER PROBE, STRAIGHT, BOX

## (undated) DEVICE — SNARE ENDOSCP M L240CM W27MM SHTH DIA2.4MM CHN 2.8MM OVL

## (undated) DEVICE — DRAPE,CHEST,FENES,15X10,STERIL: Brand: MEDLINE

## (undated) DEVICE — CONTAINER SPEC 20 ML LID NEUT BUFF FORMALIN 10 % POLYPR STS

## (undated) DEVICE — GUIDEWIRE VASC L180CM DIA0.035IN L10CM DIA3MM J TIP PTFE S

## (undated) DEVICE — CATH BLLN STENT GRAF 12FRX46MM -- RELIANT

## (undated) DEVICE — GLOVE ORANGE PI 7 1/2   MSG9075

## (undated) DEVICE — BLANKET WRM W35.4XL86.6IN FULL UNDERBODY + FORC AIR

## (undated) DEVICE — AMC/4 ARTERIAL NEEDLE – 18GA X 2.75” (7CM): Brand: ARTERIAL NEEDLE

## (undated) DEVICE — 1200 GUARD II KIT W/5MM TUBE W/O VAC TUBE: Brand: GUARDIAN

## (undated) DEVICE — ADHESIVE SKIN CLSR 0.7ML TOP DERMBND ADV

## (undated) DEVICE — SUTURE MCRYL SZ 4-0 L27IN ABSRB UD L19MM PS-2 1/2 CIR PRIM Y426H

## (undated) DEVICE — AMPLATZ EXTRA STIFF  WIRE GUIDE: Brand: AMPLATZ

## (undated) DEVICE — MICROPUNCTURE INTRODUCER SET SILHOUETTE TRANSITIONLESS WITH STAINLESS STEEL WIRE GUIDE: Brand: MICROPUNCTURE

## (undated) DEVICE — SHTH INTRO 16F X 28CM --

## (undated) DEVICE — 3M™ IOBAN™ 2 ANTIMICROBIAL INCISE DRAPE 6648EZ: Brand: IOBAN™ 2

## (undated) DEVICE — TOTAL TRAY, 16FR 10ML SIL FOLEY, URN: Brand: MEDLINE

## (undated) DEVICE — SUT VCRL 2-0 27IN CT1 UD --